# Patient Record
Sex: FEMALE | Race: BLACK OR AFRICAN AMERICAN | NOT HISPANIC OR LATINO
[De-identification: names, ages, dates, MRNs, and addresses within clinical notes are randomized per-mention and may not be internally consistent; named-entity substitution may affect disease eponyms.]

---

## 2018-10-18 PROBLEM — Z00.00 ENCOUNTER FOR PREVENTIVE HEALTH EXAMINATION: Status: ACTIVE | Noted: 2018-10-18

## 2018-11-09 ENCOUNTER — APPOINTMENT (OUTPATIENT)
Dept: PULMONOLOGY | Facility: CLINIC | Age: 78
End: 2018-11-09
Payer: MEDICARE

## 2018-11-09 VITALS
DIASTOLIC BLOOD PRESSURE: 80 MMHG | WEIGHT: 160 LBS | HEIGHT: 65 IN | OXYGEN SATURATION: 98 % | SYSTOLIC BLOOD PRESSURE: 140 MMHG | HEART RATE: 88 BPM | BODY MASS INDEX: 26.66 KG/M2 | TEMPERATURE: 97 F

## 2018-11-09 DIAGNOSIS — Z87.19 PERSONAL HISTORY OF OTHER DISEASES OF THE DIGESTIVE SYSTEM: ICD-10-CM

## 2018-11-09 DIAGNOSIS — R05 COUGH: ICD-10-CM

## 2018-11-09 DIAGNOSIS — Z78.9 OTHER SPECIFIED HEALTH STATUS: ICD-10-CM

## 2018-11-09 PROCEDURE — 94010 BREATHING CAPACITY TEST: CPT

## 2018-11-09 PROCEDURE — 99203 OFFICE O/P NEW LOW 30 MIN: CPT | Mod: 25

## 2018-11-09 PROCEDURE — 71046 X-RAY EXAM CHEST 2 VIEWS: CPT

## 2018-11-10 PROBLEM — Z87.19 HISTORY OF GASTROESOPHAGEAL REFLUX (GERD): Status: RESOLVED | Noted: 2018-11-10 | Resolved: 2018-11-10

## 2018-11-10 PROBLEM — R05 CHRONIC COUGH: Status: ACTIVE | Noted: 2018-11-09

## 2019-01-22 ENCOUNTER — APPOINTMENT (OUTPATIENT)
Dept: PULMONOLOGY | Facility: CLINIC | Age: 79
End: 2019-01-22
Payer: MEDICARE

## 2019-01-22 VITALS
HEIGHT: 65 IN | TEMPERATURE: 98.6 F | HEART RATE: 87 BPM | SYSTOLIC BLOOD PRESSURE: 118 MMHG | OXYGEN SATURATION: 100 % | DIASTOLIC BLOOD PRESSURE: 80 MMHG

## 2019-01-22 PROCEDURE — 94010 BREATHING CAPACITY TEST: CPT

## 2019-01-22 PROCEDURE — 99213 OFFICE O/P EST LOW 20 MIN: CPT | Mod: 25

## 2019-01-23 NOTE — DISCUSSION/SUMMARY
[FreeTextEntry1] : suggested she take rianitidine prior to a meal of heavy hot sauce\par \par she'll come off of the omeprazole for now.\par \par no follow up needed

## 2019-01-23 NOTE — HISTORY OF PRESENT ILLNESS
[FreeTextEntry1] : patient with chronic cough who was placed on omeprazole bid and cough is much better,  also cut down on hot sauce.

## 2019-01-23 NOTE — REVIEW OF SYSTEMS
[Cough] : no cough [Sputum] : not coughing up ~M sputum [Dyspnea] : no dyspnea [Negative] : Neurologic

## 2019-01-23 NOTE — PHYSICAL EXAM
[General Appearance - Well Developed] : well developed [Normal Appearance] : normal appearance [Well Groomed] : well groomed [General Appearance - Well Nourished] : well nourished [No Deformities] : no deformities [General Appearance - In No Acute Distress] : no acute distress [Normal Conjunctiva] : the conjunctiva exhibited no abnormalities [Eyelids - No Xanthelasma] : the eyelids demonstrated no xanthelasmas [Normal Oropharynx] : normal oropharynx [Neck Appearance] : the appearance of the neck was normal [Neck Cervical Mass (___cm)] : no neck mass was observed [Jugular Venous Distention Increased] : there was no jugular-venous distention [Thyroid Diffuse Enlargement] : the thyroid was not enlarged [Thyroid Nodule] : there were no palpable thyroid nodules [Heart Rate And Rhythm] : heart rate and rhythm were normal [Heart Sounds] : normal S1 and S2 [Murmurs] : no murmurs present [Respiration, Rhythm And Depth] : normal respiratory rhythm and effort [Exaggerated Use Of Accessory Muscles For Inspiration] : no accessory muscle use [Auscultation Breath Sounds / Voice Sounds] : lungs were clear to auscultation bilaterally [Abdomen Soft] : soft [Abdomen Tenderness] : non-tender [Abdomen Mass (___ Cm)] : no abdominal mass palpated [Nail Clubbing] : no clubbing of the fingernails [Cyanosis, Localized] : no localized cyanosis [Petechial Hemorrhages (___cm)] : no petechial hemorrhages [Skin Color & Pigmentation] : normal skin color and pigmentation [] : no rash [No Venous Stasis] : no venous stasis [Skin Lesions] : no skin lesions [No Skin Ulcers] : no skin ulcer [No Xanthoma] : no  xanthoma was observed [Deep Tendon Reflexes (DTR)] : deep tendon reflexes were 2+ and symmetric [Sensation] : the sensory exam was normal to light touch and pinprick [No Focal Deficits] : no focal deficits [Oriented To Time, Place, And Person] : oriented to person, place, and time [Impaired Insight] : insight and judgment were intact [Affect] : the affect was normal

## 2019-07-12 ENCOUNTER — APPOINTMENT (OUTPATIENT)
Dept: ORTHOPEDIC SURGERY | Facility: CLINIC | Age: 79
End: 2019-07-12
Payer: MEDICARE

## 2019-07-12 VITALS — WEIGHT: 160 LBS | BODY MASS INDEX: 26.66 KG/M2 | HEIGHT: 65 IN

## 2019-07-12 DIAGNOSIS — M25.551 PAIN IN RIGHT HIP: ICD-10-CM

## 2019-07-12 PROCEDURE — 99213 OFFICE O/P EST LOW 20 MIN: CPT | Mod: 25

## 2019-07-12 PROCEDURE — 20611 DRAIN/INJ JOINT/BURSA W/US: CPT | Mod: 50

## 2019-07-12 NOTE — REASON FOR VISIT
[Follow-Up Visit] : a follow-up visit for [Knee Pain] : knee pain [FreeTextEntry2] : BILATERAL KNEE PAIN, HIP PAIN

## 2019-07-12 NOTE — HISTORY OF PRESENT ILLNESS
[Pain Location] : pain [Constant] : ~He/She~ states the symptoms seem to be constant [] : right & left knee [Walking] : worsened by walking [de-identified] : BILATERAL KNEE PAIN\par RIGHT KNEE WORSE THEN LEFT\par JULY 2018  MUCH WORSE \par PAIN LEVEL 10/10\par CONSTANT PAIN\par RADIATING PAIN\par PAIN IN RIGHT HIP- CRACKING, LIMITED WEIGHT BEARING\par NOTHING HELPS\par WORSE WITH WALKING, UP AND DOWN STAIRS\par BENDING\par CRACKING\par SWELLING\par STIFFNESS

## 2019-07-12 NOTE — PROCEDURE
[de-identified] : Patient has demonstrated limited relief from NSAIDS, rest, exercises / PT , and after discussion of the risks and benefits, the patient has elected to proceed with a\par n ULTRASOUND GUIDED corticosteroid injection into the BILATERAL SupeLat PF Knee\par  \par Confirmed that the patient does not have history of prior adverse reactions, active, infections, or relevant allergies. There was no effusion, erythema, or warmth, and the skin was clear\par \par The skin was sterilized with alcohol. Ethyl Chloride was used as a topical anesthetic. Routine sterile technique. \par  \par The KNEE JOINT  was injected utilizing ULTRASOUND GUIDANCEwith KENALOG + LIDOCAINE. The injection was completed without complication and a bandage was applied.\par \par The patient tolerated the procedure well and was given post-injection instructions.Rec: Cold therapy, analgesics, avoid heavy activity.\par \par MEDICATION: Lidocaine 1% 4cc + 10mg of Kenalog EACH KNEE\par \par EFFUSION ASPIRATED  - LRFT 25CC, RIGHT 30CC\par

## 2019-07-12 NOTE — PHYSICAL EXAM
[de-identified] : PHYSICAL EXAM BILATERAL KNEES\par \par AROM\par RIGHT  0- 100\par LEFT    0- 100\par \par SPECIAL TESTS\par \par PATELLAR GRIND = GRIND \par DRAWER  = NEG\par LACHMAN = NEG\par MACMURRAY = NEG \par \par MOTOR = GROSSLY INTACT\par SENSORY = GROSSLY INTACT \par \par \par

## 2019-11-11 ENCOUNTER — MEDICATION RENEWAL (OUTPATIENT)
Age: 79
End: 2019-11-11

## 2020-01-16 ENCOUNTER — APPOINTMENT (OUTPATIENT)
Dept: ORTHOPEDIC SURGERY | Facility: CLINIC | Age: 80
End: 2020-01-16
Payer: MEDICARE

## 2020-01-16 PROCEDURE — 20611 DRAIN/INJ JOINT/BURSA W/US: CPT | Mod: 50

## 2020-01-16 PROCEDURE — 99214 OFFICE O/P EST MOD 30 MIN: CPT | Mod: 25

## 2020-01-16 NOTE — PROCEDURE
[de-identified] : INJECTION / ASPIRATION BILATERAL KNEES\par \par Patient has demonstrated limited relief from NSAIDS, rest, exercises / PT , and after discussion of the risks and benefits, the patient has elected to proceed with a\par n ULTRASOUND GUIDED corticosteroid injection into the BILATERAL SupeLat PF Knee\par  \par Confirmed that the patient does not have history of prior adverse reactions, active, infections, or relevant allergies. There was no effusion, erythema, or warmth, and the skin was clear\par \par The skin was sterilized with alcohol. Ethyl Chloride was used as a topical anesthetic. Routine sterile technique. \par  \par The KNEE JOINT  was injected utilizing ULTRASOUND GUIDANCEwith KENALOG + LIDOCAINE. The injection was completed without complication and a bandage was applied.\par \par The patient tolerated the procedure well and was given post-injection instructions.Rec: Cold therapy, analgesics, avoid heavy activity.\par \par MEDICATION: Lidocaine 1% 4cc + 10mg of Kenalog\par \par EFFUSION: RIGHT 27CC, LEFT 10CC

## 2020-01-16 NOTE — PHYSICAL EXAM
[de-identified] : PHYSICAL EXAM BILATERAL KNEES\par \par MODERATE EFFUSION  RIGHT KNEE \par AROM\par RIGHT  0- 100\par LEFT    0- 100\par \par SPECIAL TESTS\par \par PATELLAR GRIND = GRIND \par DRAWER  = NEG\par LACHMAN = NEG\par MACMURRAY = NEG \par \par MOTOR = GROSSLY INTACT\par SENSORY = GROSSLY INTACT \par \par \par

## 2020-01-16 NOTE — HISTORY OF PRESENT ILLNESS
[de-identified] : RIGHT KNEE\par FOLLOW UP\par FLARE UP 2 WEEKS AGO\par PAIN LEVEL 8/10\par WORSE WITH WEIGHTBEARING\par SWELLING\par STIFFNESS\par INSTABILITY

## 2020-09-15 ENCOUNTER — LABORATORY RESULT (OUTPATIENT)
Age: 80
End: 2020-09-15

## 2020-09-15 ENCOUNTER — APPOINTMENT (OUTPATIENT)
Dept: NEPHROLOGY | Facility: CLINIC | Age: 80
End: 2020-09-15
Payer: MEDICARE

## 2020-09-15 VITALS — DIASTOLIC BLOOD PRESSURE: 72 MMHG | HEART RATE: 70 BPM | SYSTOLIC BLOOD PRESSURE: 115 MMHG

## 2020-09-15 VITALS — SYSTOLIC BLOOD PRESSURE: 124 MMHG | HEART RATE: 72 BPM | DIASTOLIC BLOOD PRESSURE: 101 MMHG

## 2020-09-15 VITALS — TEMPERATURE: 97.8 F

## 2020-09-15 PROCEDURE — 36415 COLL VENOUS BLD VENIPUNCTURE: CPT

## 2020-09-15 PROCEDURE — 99204 OFFICE O/P NEW MOD 45 MIN: CPT | Mod: 25

## 2020-09-15 RX ORDER — HYALURONATE SOD, CROSS-LINKED 30 MG/3 ML
30 SYRINGE (ML) INTRAARTICULAR
Qty: 2 | Refills: 0 | Status: DISCONTINUED | OUTPATIENT
Start: 2020-01-16 | End: 2020-09-15

## 2020-09-15 RX ORDER — HYALURONATE SOD, CROSS-LINKED 30 MG/3 ML
30 SYRINGE (ML) INTRAARTICULAR
Qty: 2 | Refills: 0 | Status: DISCONTINUED | OUTPATIENT
Start: 2019-11-11 | End: 2020-09-15

## 2020-09-15 RX ORDER — HYALURONATE SOD, CROSS-LINKED 30 MG/3 ML
30 SYRINGE (ML) INTRAARTICULAR
Qty: 2 | Refills: 0 | Status: DISCONTINUED | COMMUNITY
Start: 2019-07-12 | End: 2020-09-15

## 2020-09-15 RX ORDER — NAPROXEN 500 MG/1
500 TABLET ORAL
Refills: 0 | Status: DISCONTINUED | COMMUNITY
End: 2020-09-15

## 2020-09-15 RX ORDER — OMEPRAZOLE 20 MG/1
20 CAPSULE, DELAYED RELEASE ORAL TWICE DAILY
Qty: 60 | Refills: 11 | Status: DISCONTINUED | COMMUNITY
Start: 2018-11-09 | End: 2020-09-15

## 2020-09-15 NOTE — PHYSICAL EXAM
[General Appearance - Alert] : alert [General Appearance - In No Acute Distress] : in no acute distress [Sclera] : the sclera and conjunctiva were normal [Extraocular Movements] : extraocular movements were intact [PERRL With Normal Accommodation] : pupils were equal in size, round, and reactive to light [Outer Ear] : the ears and nose were normal in appearance [Neck Cervical Mass (___cm)] : no neck mass was observed [Neck Appearance] : the appearance of the neck was normal [Thyroid Diffuse Enlargement] : the thyroid was not enlarged [Thyroid Nodule] : there were no palpable thyroid nodules [Jugular Venous Distention Increased] : there was no jugular-venous distention [Heart Rate And Rhythm] : heart rate was normal and rhythm regular [Auscultation Breath Sounds / Voice Sounds] : lungs were clear to auscultation bilaterally [Heart Sounds] : normal S1 and S2 [Murmurs] : no murmurs [Heart Sounds Gallop] : no gallops [Heart Sounds Pericardial Friction Rub] : no pericardial rub [Full Pulse] : the pedal pulses are present [Edema] : there was no peripheral edema [Bowel Sounds] : normal bowel sounds [Abdomen Soft] : soft [Abdomen Tenderness] : non-tender [Abdomen Mass (___ Cm)] : no abdominal mass palpated [Cervical Lymph Nodes Enlarged Posterior Bilaterally] : posterior cervical [Cervical Lymph Nodes Enlarged Anterior Bilaterally] : anterior cervical [Abnormal Walk] : normal gait [Supraclavicular Lymph Nodes Enlarged Bilaterally] : supraclavicular [Nail Clubbing] : no clubbing  or cyanosis of the fingernails [Musculoskeletal - Swelling] : no joint swelling seen [Motor Tone] : muscle strength and tone were normal [Skin Color & Pigmentation] : normal skin color and pigmentation [Skin Turgor] : normal skin turgor [] : no rash [Oriented To Time, Place, And Person] : oriented to person, place, and time [Affect] : the affect was normal [Impaired Insight] : insight and judgment were intact

## 2020-09-15 NOTE — CONSULT LETTER
[FreeTextEntry1] : Dear Aidan,\par \par I had the pleasure of seeing Maryanne Barrett in consultation for kidney disease. My note is attached.\par \par Thank you for the opportunity to participate in the care of your patient.\par \par Please call me on my cell phone at [phone] or in the office at [phone] if you have any questions.\par \par Best regards,\par \par Rusty\par \par Rusty Nevarez MD, FACP, FASN\par [address], [place]\par [email or web address]\par Office: [phone]\par Mobile: [phone]

## 2020-09-15 NOTE — REVIEW OF SYSTEMS
[Difficulty Walking] : difficulty walking [Negative] : Heme/Lymph [de-identified] : walks with walker

## 2020-09-15 NOTE — HISTORY OF PRESENT ILLNESS
[FreeTextEntry1] : Kindly referred by Aidan Howard for elevated creatinine. \par \par * The previous records were reviewed and summarized. Creatinine 1.26 (eGFR 46) on 7/23/20. Her creatinine has slowly increased from 0.9 in 6/19 to 1.05 in 2/4. No proteinuria or hematuria. Naproxen had been listed as one of her medications, but she says she has not been taking this. \par \par * The patient denies exposure to chronic NSAIDs, chronic PPIs, green smoothies, creatine, or herbal supplements. The patient denies a history of kidney stones or UTIs. No HTN or DM. No significant nocturia. No recent renal ultrasound. They are unaware of proteinuria or hematuria. \par

## 2020-09-15 NOTE — ASSESSMENT
[FreeTextEntry1] : # Slight worsening in renal function over 1 year of unclear cause, now CKD stage 3.\par * Recheck labs, including monoclonal protein evaluation.\par *  * A point of care renal ultrasound using the Butterfly iQ was performed and the images were personally reviewed. (The patient understands that this was a brief study to evaluate for hydronephrosis and not a complete renal ultrasound.) The ultrasound showed no significant hydronephrosis. A complete renal ultrasound was recommended and information was provided to the patient about scheduling. They were instructed that this imaging study is important for their health and that it is their responsibility to make and keep this appointment. All their questions were answered.\par * The patient has been counseled that chronic kidney disease is a significant condition and regular office followup with me (at least every 4 months for now) is essential for monitoring, and that it is their responsibility to make a follow up appointment.\par * A counseling information sheet has been given (currently or previously, in-person or electronically). All their questions were answered.\par * The patient has been counseled never to stop taking their medications without discussing it with me or another doctor.\par * The patient has been counseled on risk of acute renal failure and instructed to immediately call and speak with me or go immediately to ER with any severe symptoms, nausea, vomiting, diarrhea, chest pain, or shortness of breath.\par * The patient has been counseled on avoiding NSAIDs.\par \par # HTN controlled.\par * The patient's blood pressure was checked with the [employer]ron [id number] using the SPRINT trial protocol after sitting quietly in an empty room with arm supported, back supported, and feet on the floor for 5 minutes. The average of 3 readings were taken.\par * A counseling information sheet has been given (currently or previously, in-person or electronically). All their questions were answered.\par * The patient has been counseled to check their BP at home with an automatic arm cuff, write down the readings, and reach me directly on the phone immediately if they are persistently > 180 systolic or if SBP is less than 100 or if lightheadedness develops. They were counseled to bring in all blood pressure readings and medications next visit.\par * The patient has been counseled that they have a a significant medical condition and regular office followup (at least every 4 months for now) is essential for monitoring, and that it is their responsibility to make follow up appointments.\par * The patient also has been counseled that they must never stop or change any medications without discussing this with me (or another physician). \par \par

## 2020-09-27 LAB
25(OH)D3 SERPL-MCNC: 21.2 NG/ML
ALBUMIN MFR SERPL ELPH: 58.5 %
ALBUMIN SERPL ELPH-MCNC: 4.3 G/DL
ALBUMIN SERPL-MCNC: 4 G/DL
ALBUMIN/GLOB SERPL: 1.4 RATIO
ALBUPE: 14.3 %
ALP BLD-CCNC: 94 U/L
ALPHA1 GLOB MFR SERPL ELPH: 4.2 %
ALPHA1 GLOB SERPL ELPH-MCNC: 0.3 G/DL
ALPHA1UPE: 33.8 %
ALPHA2 GLOB MFR SERPL ELPH: 9.8 %
ALPHA2 GLOB SERPL ELPH-MCNC: 0.7 G/DL
ALPHA2UPE: 24.7 %
ALT SERPL-CCNC: 15 U/L
ANION GAP SERPL CALC-SCNC: 14 MMOL/L
APPEARANCE: CLEAR
AST SERPL-CCNC: 19 U/L
B-GLOBULIN MFR SERPL ELPH: 11.5 %
B-GLOBULIN SERPL ELPH-MCNC: 0.8 G/DL
BASOPHILS # BLD AUTO: 0.04 K/UL
BASOPHILS NFR BLD AUTO: 0.8 %
BETAUPE: 18.6 %
BILIRUB SERPL-MCNC: 0.3 MG/DL
BILIRUBIN URINE: NEGATIVE
BLOOD URINE: NEGATIVE
BUN SERPL-MCNC: 15 MG/DL
CALCIUM SERPL-MCNC: 9.9 MG/DL
CALCIUM SERPL-MCNC: 9.9 MG/DL
CHLORIDE SERPL-SCNC: 104 MMOL/L
CO2 SERPL-SCNC: 25 MMOL/L
COLOR: YELLOW
CREAT SERPL-MCNC: 0.99 MG/DL
CREAT SPEC-SCNC: 260 MG/DL
CREAT SPEC-SCNC: 260 MG/DL
CREAT/PROT UR: 0.1 RATIO
CYSTATIN C SERPL-MCNC: 1.38 MG/L
DEPRECATED KAPPA LC FREE/LAMBDA SER: 1.16 RATIO
EOSINOPHIL # BLD AUTO: 0.08 K/UL
EOSINOPHIL NFR BLD AUTO: 1.6 %
GAMMA GLOB FLD ELPH-MCNC: 1.1 G/DL
GAMMA GLOB MFR SERPL ELPH: 16 %
GAMMAUPE: 8.6 %
GFR/BSA.PRED SERPLBLD CYS-BASED-ARV: 44 ML/MIN
GLUCOSE QUALITATIVE U: NEGATIVE
GLUCOSE SERPL-MCNC: 84 MG/DL
HCT VFR BLD CALC: 44.7 %
HGB BLD-MCNC: 14.1 G/DL
IGA 24H UR QL IFE: NORMAL
IGA SER QL IEP: 232 MG/DL
IGG SER QL IEP: 1079 MG/DL
IGM SER QL IEP: 78 MG/DL
IMM GRANULOCYTES NFR BLD AUTO: 0 %
INTERPRETATION SERPL IEP-IMP: NORMAL
KAPPA LC 24H UR QL: NORMAL
KAPPA LC CSF-MCNC: 1.13 MG/DL
KAPPA LC SERPL-MCNC: 1.31 MG/DL
KETONES URINE: NORMAL
LEUKOCYTE ESTERASE URINE: NEGATIVE
LYMPHOCYTES # BLD AUTO: 1.93 K/UL
LYMPHOCYTES NFR BLD AUTO: 39.6 %
M PROTEIN SPEC IFE-MCNC: NORMAL
MAGNESIUM SERPL-MCNC: 2.1 MG/DL
MAN DIFF?: NORMAL
MCHC RBC-ENTMCNC: 28.3 PG
MCHC RBC-ENTMCNC: 31.5 GM/DL
MCV RBC AUTO: 89.6 FL
MICROALBUMIN 24H UR DL<=1MG/L-MCNC: 1.5 MG/DL
MICROALBUMIN/CREAT 24H UR-RTO: 6 MG/G
MONOCYTES # BLD AUTO: 0.5 K/UL
MONOCYTES NFR BLD AUTO: 10.3 %
NEUTROPHILS # BLD AUTO: 2.32 K/UL
NEUTROPHILS NFR BLD AUTO: 47.7 %
NITRITE URINE: NEGATIVE
PARATHYROID HORMONE INTACT: 53 PG/ML
PH URINE: 5.5
PHOSPHATE SERPL-MCNC: 3.6 MG/DL
PLATELET # BLD AUTO: 206 K/UL
POTASSIUM SERPL-SCNC: 4 MMOL/L
PROT PATTERN 24H UR ELPH-IMP: NORMAL
PROT SERPL-MCNC: 6.9 G/DL
PROT UR-MCNC: 20 MG/DL
PROT UR-MCNC: 26 MG/DL
PROT UR-MCNC: 26 MG/DL
PROTEIN URINE: NORMAL
RBC # BLD: 4.99 M/UL
RBC # FLD: 14.4 %
SODIUM SERPL-SCNC: 143 MMOL/L
SPECIFIC GRAVITY URINE: 1.02
UROBILINOGEN URINE: NORMAL
VIT B12 SERPL-MCNC: 432 PG/ML
WBC # FLD AUTO: 4.87 K/UL

## 2021-02-05 ENCOUNTER — APPOINTMENT (OUTPATIENT)
Dept: NEPHROLOGY | Facility: CLINIC | Age: 81
End: 2021-02-05
Payer: MEDICARE

## 2021-02-05 VITALS — TEMPERATURE: 98.4 F | BODY MASS INDEX: 25.29 KG/M2 | WEIGHT: 152 LBS

## 2021-02-05 VITALS — SYSTOLIC BLOOD PRESSURE: 102 MMHG | DIASTOLIC BLOOD PRESSURE: 72 MMHG | HEART RATE: 96 BPM

## 2021-02-05 VITALS — HEART RATE: 80 BPM | DIASTOLIC BLOOD PRESSURE: 68 MMHG | SYSTOLIC BLOOD PRESSURE: 105 MMHG

## 2021-02-05 DIAGNOSIS — M25.561 PAIN IN RIGHT KNEE: ICD-10-CM

## 2021-02-05 DIAGNOSIS — M25.562 PAIN IN RIGHT KNEE: ICD-10-CM

## 2021-02-05 PROCEDURE — 99214 OFFICE O/P EST MOD 30 MIN: CPT

## 2021-02-05 PROCEDURE — 99072 ADDL SUPL MATRL&STAF TM PHE: CPT

## 2021-02-05 NOTE — ASSESSMENT
[FreeTextEntry1] : # CKD now improved.\par * Recheck labs.\par * The patient has been counseled that chronic kidney disease is a significant condition and regular office followup with me (at least every 4 months for now) is important for monitoring and their health, and that it is their responsibility to make a follow up appointment.\par * A counseling information sheet has been given (currently or previously, in-person or electronically). All their questions were answered.\par * The patient has been counseled never to stop taking their medications without discussing it with me or another doctor.\par * The patient has been counseled on risk of acute renal failure and instructed to immediately call and speak with me or go immediately to ER with any severe symptoms, nausea, vomiting, diarrhea, chest pain, or shortness of breath.\par * The patient has been counseled on avoiding NSAIDs.\par \par # Knee arthtiritis.\par * Avoid NSAIDs if possible.\par \par # HTN controlled.\par * Continue amlodipine.\par * The patient's blood pressure was checked with the Omron [id number] using the SPRINT trial protocol after sitting quietly in an empty room with arm supported, back supported, and feet on the floor for 5 minutes. The average of 3 readings were taken.\par * A counseling information sheet has been given (currently or previously, in-person or electronically). All their questions were answered.\par * The patient has been counseled to check their BP at home with an automatic arm cuff, write down the readings, and reach me directly on the phone immediately if they are persistently > 180 systolic or if SBP is less than 100 or if lightheadedness develops. They were counseled to bring in all blood pressure readings and medications next visit.\par * The patient has been counseled that regular office followup (at least every 4 months for now)  is important for monitoring and for their health, and that it is their responsibility to make follow up appointments.\par * The patient also has been counseled that they must never stop or change any medications without discussing this with me (or another physician). \par

## 2021-02-05 NOTE — PHYSICAL EXAM
[General Appearance - Alert] : alert [General Appearance - In No Acute Distress] : in no acute distress [Auscultation Breath Sounds / Voice Sounds] : lungs were clear to auscultation bilaterally [Heart Rate And Rhythm] : heart rate was normal and rhythm regular [Heart Sounds] : normal S1 and S2 [Heart Sounds Gallop] : no gallops [Murmurs] : no murmurs [Heart Sounds Pericardial Friction Rub] : no pericardial rub [Edema] : there was no peripheral edema [Bowel Sounds] : normal bowel sounds [Abdomen Soft] : soft [Abdomen Tenderness] : non-tender [] : no hepato-splenomegaly [Abdomen Mass (___ Cm)] : no abdominal mass palpated

## 2021-02-05 NOTE — HISTORY OF PRESENT ILLNESS
[FreeTextEntry1] : Kindly referred by Aidan Howard for elevated creatinine. \par \par * The previous records from [facility] were reviewed and summarized. * CKD improved, creatinine decreased to 0.99 last visit. Creatinine 0.92 in December. * HTN controlled on amlodipine. * Persistnet knee pain. She is avoiding NSAIDs. \par \par Previous history ([id number]): * The previous records were reviewed and summarized. Creatinine 1.26 (eGFR 46) on 7/23/20. Her creatinine has slowly increased from 0.9 in 6/19 to 1.05 in 2/4. No proteinuria or hematuria. Naproxen had been listed as one of her medications, but she says she has not been taking this. \par \par * The patient denies exposure to chronic NSAIDs, chronic PPIs, green smoothies, creatine, or herbal supplements. The patient denies a history of kidney stones or UTIs. No HTN or DM. No significant nocturia. No recent renal ultrasound. They are unaware of proteinuria or hematuria. \par

## 2021-04-05 ENCOUNTER — APPOINTMENT (OUTPATIENT)
Dept: ORTHOPEDIC SURGERY | Facility: CLINIC | Age: 81
End: 2021-04-05
Payer: MEDICARE

## 2021-04-05 PROCEDURE — 99072 ADDL SUPL MATRL&STAF TM PHE: CPT

## 2021-04-05 PROCEDURE — 99214 OFFICE O/P EST MOD 30 MIN: CPT | Mod: 25

## 2021-04-05 PROCEDURE — 20611 DRAIN/INJ JOINT/BURSA W/US: CPT | Mod: 50

## 2021-04-05 RX ORDER — ACETAMINOPHEN 325 MG/1
325 TABLET, FILM COATED ORAL
Qty: 60 | Refills: 0 | Status: ACTIVE | OUTPATIENT
Start: 2021-04-05

## 2021-04-05 NOTE — DISCUSSION/SUMMARY
[de-identified] : \par PATIENT HAS ELECTED TO PROCEED WITH KENALOG INJECTION KNEES \par RISKS AND BENEFITS DISCUSSED - VERBAL CONSENT OBTAINED \par \par \par POST INJECTION INSTRUCTIONS\par \par COLD THERAPY , TYLENOL  PRN\par \par HOME STRETCHING AND EXERCISES QD - HANDOUT PROVIDED \par \par START P.T.  2 WEEKS AFTER INJECTION \par \par \par

## 2021-04-05 NOTE — PROCEDURE
[de-identified] : INJECTION / ASPIRATION BILATERAL KNEES\par \par Patient has demonstrated limited relief from NSAIDS, rest, exercises / PT , and after discussion of the risks and benefits, the patient has elected to proceed with a\par n ULTRASOUND GUIDED corticosteroid injection into the BILATERAL SupeLat PF Knee\par  \par Confirmed that the patient does not have history of prior adverse reactions, active, infections, or relevant allergies. There was no effusion, erythema, or warmth, and the skin was clear\par \par The skin was sterilized with alcohol. Ethyl Chloride was used as a topical anesthetic. Routine sterile technique. \par  \par The KNEE JOINT was injected utilizing ULTRASOUND GUIDANCEwith KENALOG + LIDOCAINE. The injection was completed without complication and a bandage was applied.\par \par The patient tolerated the procedure well and was given post-injection instructions.Rec: Cold therapy, analgesics, avoid heavy activity.\par \par MEDICATION: Lidocaine 1% 4cc + 10mg of Kenalog\par \par EFFUSION: RIGHT 40CC, LEFT 25CC.

## 2021-04-05 NOTE — HISTORY OF PRESENT ILLNESS
[de-identified] : BILATERAL KNEE \par FOLLOW UP\par RIGHT WORSE THAN LEFT\par PAIN LEVEL: 10/10\par SWELLING \par LAST CORTISONE INJECTION 1/16/2020- NOT HELPFUL

## 2021-04-05 NOTE — PHYSICAL EXAM
[de-identified] : PHYSICAL EXAM BILATERAL KNEES\par \par MODERATE EFFUSION RIGHT KNEE \par AROM\par RIGHT 0- 100\par LEFT 0- 100\par \par SPECIAL TESTS\par \par PATELLAR GRIND = GRIND \par DRAWER = NEG\par LACHMAN = NEG\par MACMURRAY = NEG \par \par MOTOR = GROSSLY INTACT\par SENSORY = GROSSLY INTACT

## 2021-05-13 RX ORDER — TRAMADOL HYDROCHLORIDE 50 MG/1
0.5 TABLET ORAL
Qty: 0 | Refills: 0 | DISCHARGE
Start: 2021-05-13

## 2021-06-03 ENCOUNTER — APPOINTMENT (OUTPATIENT)
Dept: ORTHOPEDIC SURGERY | Facility: CLINIC | Age: 81
End: 2021-06-03
Payer: MEDICARE

## 2021-06-03 DIAGNOSIS — M25.461 EFFUSION, RIGHT KNEE: ICD-10-CM

## 2021-06-03 DIAGNOSIS — M25.462 EFFUSION, LEFT KNEE: ICD-10-CM

## 2021-06-03 PROCEDURE — 73562 X-RAY EXAM OF KNEE 3: CPT | Mod: 50

## 2021-06-03 PROCEDURE — 20611 DRAIN/INJ JOINT/BURSA W/US: CPT | Mod: 50

## 2021-06-03 PROCEDURE — 99213 OFFICE O/P EST LOW 20 MIN: CPT | Mod: 25

## 2021-06-03 NOTE — DISCUSSION/SUMMARY
[de-identified] : RECURRING PAINFUL EFFUSION LEFT KNEE\par \par SEVERE PF OA , MODERATE MEDIAL OA\par \par 65CC JOINT EFFUSION ASPIRATED \par \par KENALOG 40MG INJECTED\par \par \par REFER TO DR RODNEY FOR TKA - RIGHT OR BILATERAL

## 2021-06-03 NOTE — HISTORY OF PRESENT ILLNESS
[de-identified] : RIGHT KNEE \par FOLLOW UP \par FLARE UP MAY 21, 2021 -PT WENT TO ER- [facility] \par PAIN LEVEL:10/10 \par PREVIOUS CORTISONE INJECTION -NOT HELPFUL

## 2021-06-03 NOTE — PROCEDURE
[de-identified] : INJECTION ASPIRATION RIGHT AND LEFT KNEE\par \par Patient has demonstrated limited relief from NSAIDS, rest, exercises / PT , and after discussion of the risks and benefits, the patient has elected to proceed with a\par n ULTRASOUND GUIDED corticosteroid injection into the RIGHT AND  LEFT SupeLat PF Knee\par  \par Confirmed that the patient does not have history of prior adverse reactions, active, infections, or relevant allergies. There was no effusion, erythema, or warmth, and the skin was clear\par \par The skin was sterilized with alcohol. Ethyl Chloride was used as a topical anesthetic. Routine sterile technique. \par  \par The KNEE JOINT  was injected utilizing ULTRASOUND GUIDANCEwith KENALOG + LIDOCAINE. The injection was completed without complication and a bandage was applied.\par \par The patient tolerated the procedure well and was given post-injection instructions.Rec: Cold therapy, analgesics, avoid heavy activity.\par \par MEDICATION: Lidocaine 1% 4cc + 40mg of Kenalog\par \par EFFUSION ASPIRATED: RIGHT  56CC CLEAR YELLOW , LEFT 35CC CLEAR YELLOW \par

## 2021-06-08 ENCOUNTER — APPOINTMENT (OUTPATIENT)
Dept: NEPHROLOGY | Facility: CLINIC | Age: 81
End: 2021-06-08
Payer: MEDICARE

## 2021-06-08 VITALS — SYSTOLIC BLOOD PRESSURE: 152 MMHG | DIASTOLIC BLOOD PRESSURE: 80 MMHG | HEART RATE: 67 BPM

## 2021-06-08 VITALS — TEMPERATURE: 97.1 F

## 2021-06-08 PROCEDURE — 99214 OFFICE O/P EST MOD 30 MIN: CPT

## 2021-06-08 NOTE — PHYSICAL EXAM
[General Appearance - Alert] : alert [General Appearance - In No Acute Distress] : in no acute distress [Neck Appearance] : the appearance of the neck was normal [Neck Cervical Mass (___cm)] : no neck mass was observed [Jugular Venous Distention Increased] : there was no jugular-venous distention [Thyroid Diffuse Enlargement] : the thyroid was not enlarged [Thyroid Nodule] : there were no palpable thyroid nodules [Heart Rate And Rhythm] : heart rate was normal and rhythm regular [Heart Sounds] : normal S1 and S2 [Heart Sounds Gallop] : no gallops [Murmurs] : no murmurs [Heart Sounds Pericardial Friction Rub] : no pericardial rub [Edema] : there was no peripheral edema [Bowel Sounds] : normal bowel sounds [Abdomen Soft] : soft [Abdomen Tenderness] : non-tender [] : no hepato-splenomegaly [Abdomen Mass (___ Cm)] : no abdominal mass palpated [Cervical Lymph Nodes Enlarged Posterior Bilaterally] : posterior cervical [Cervical Lymph Nodes Enlarged Anterior Bilaterally] : anterior cervical [Supraclavicular Lymph Nodes Enlarged Bilaterally] : supraclavicular

## 2021-06-08 NOTE — HISTORY OF PRESENT ILLNESS
[FreeTextEntry1] : Kindly referred by Aidan Howard for elevated creatinine. \par \par * HTN uncontrolled on amlodipine 2.5. * LEONA improved, creatinine decresaed to 0.92. *  Persistent knee pain. She is avoiding NSAIDs.  \par \par Previous history (05Feb21): * The previous records from [facility] were reviewed and summarized. * CKD improved, creatinine decreased to 0.99 last visit. Creatinine 0.92 in December. * HTN controlled on amlodipine. * Persistnet knee pain. She is avoiding NSAIDs.  \par \par Previous history ([id number]): * The previous records were reviewed and summarized. Creatinine 1.26 (eGFR 46) on 7/23/20. Her creatinine has slowly increased from 0.9 in 6/19 to 1.05 in 2/4. No proteinuria or hematuria. Naproxen had been listed as one of her medications, but she says she has not been taking this. \par \par * The patient denies exposure to chronic NSAIDs, chronic PPIs, green smoothies, creatine, or herbal supplements. The patient denies a history of kidney stones or UTIs. No HTN or DM. No significant nocturia. No recent renal ultrasound. They are unaware of proteinuria or hematuria. \par

## 2021-06-08 NOTE — ASSESSMENT
[FreeTextEntry1] : # CKD now improved.\par * Therapies for kidney disease: blood pressure control; other evidence-based therapies including exercise, a plant-based lower oxalate diet, and 400 mcg folic acid daily\par * Cardiovascular disease prevention: counseling on healthy diet, physical activity, weight loss, alcohol limitation, blood pressure control\par * The patient has been counseled that chronic kidney disease is a significant condition and regular office followup with me (at least every 2 months for now) is important for monitoring and their health, and that it is their responsibility to make a follow up appointment.\par * The patient has been counseled never to stop taking their medications without discussing it with me or another doctor.\par * The patient has been counseled on avoiding NSAIDs.\par * The patient has been counseled on risk of acute renal failure and instructed to immediately call and speak with me or go immediately to ER with any severe symptoms, nausea, vomiting, diarrhea, chest pain, or shortness of breath.\par * A counseling information sheet has been given (today or previously). All their questions were answered.\par \par # HTN uncontrolled.\par * Increase amlodipine to 5.\par * The patient's blood pressure was checked with the [employer] [id number]-907XL using the SPRINT trial protocol after sitting quietly in an empty room with arm supported, back supported, and feet on the floor for 5 minutes. The average of 3 readings were taken.\par * A counseling information sheet has been given (currently or previously, in-person or electronically). All their questions were answered.\par * The patient has been counseled to check their BP at home with an automatic arm cuff, write down the readings, and reach me directly on the phone immediately if they are persistently > 180 systolic or if SBP is less than 100 or if lightheadedness develops. They were counseled to bring in all blood pressure readings and medications next visit.\par * The patient has been counseled that regular office followup (at least every 2 months for now)  is important for monitoring and for their health, and that it is their responsibility to make follow up appointments.\par * The patient also has been counseled that they must never stop or change any medications without discussing this with me (or another physician). \par \par # Knee arthritis.\par * Avoid NSAIDs if possible.

## 2021-06-25 ENCOUNTER — APPOINTMENT (OUTPATIENT)
Dept: ORTHOPEDIC SURGERY | Facility: CLINIC | Age: 81
End: 2021-06-25
Payer: MEDICARE

## 2021-06-25 PROCEDURE — 99214 OFFICE O/P EST MOD 30 MIN: CPT

## 2021-06-25 NOTE — REASON FOR VISIT
[Follow-Up Visit] : a follow-up visit for [FreeTextEntry2] : REFERRED BY DR. AYON FOR RIGHT KNEE REPLACEMENT

## 2021-06-25 NOTE — HISTORY OF PRESENT ILLNESS
[de-identified] : First visit with me for this patient is here to discuss right knee replacement. She's been treated with conservative measures by one of our other orthopedic surgeon in conservative measures are no longer helping. Patient should discuss knee replacement surgery

## 2021-06-25 NOTE — DISCUSSION/SUMMARY
[de-identified] : Patient I. discussed our options. The reasonable risks and benefits of knee replacement surgery were discussed in detail. Patient asked appropriate questions which were answered to her satisfaction. We talked about potential complications as well as to the convalescent expectations. The complications may require revision surgery such as component migration wear loosening infection stiffness instability.

## 2021-06-25 NOTE — PHYSICAL EXAM
[de-identified] : Right knee range of motion 0-95°. On examination seems the patella is laterally based. She is neurovascular intact distally. Quad tone is good. Knee is stable to stress rest no distress. [de-identified] : AP standing and lateral and sunrise views are obtained showing severe patella from arthritis of both knees more dense on the right. 2 femoral articulation is fairly well maintained with moderate lateral arthritis per

## 2021-06-30 ENCOUNTER — APPOINTMENT (OUTPATIENT)
Dept: NEPHROLOGY | Facility: CLINIC | Age: 81
End: 2021-06-30
Payer: MEDICARE

## 2021-06-30 VITALS — SYSTOLIC BLOOD PRESSURE: 105 MMHG | HEART RATE: 76 BPM | DIASTOLIC BLOOD PRESSURE: 66 MMHG

## 2021-06-30 DIAGNOSIS — R79.89 OTHER SPECIFIED ABNORMAL FINDINGS OF BLOOD CHEMISTRY: ICD-10-CM

## 2021-06-30 DIAGNOSIS — Z01.818 ENCOUNTER FOR OTHER PREPROCEDURAL EXAMINATION: ICD-10-CM

## 2021-06-30 DIAGNOSIS — I10 ESSENTIAL (PRIMARY) HYPERTENSION: ICD-10-CM

## 2021-06-30 PROCEDURE — 99214 OFFICE O/P EST MOD 30 MIN: CPT

## 2021-06-30 RX ORDER — AMLODIPINE BESYLATE 2.5 MG/1
2.5 TABLET ORAL DAILY
Qty: 90 | Refills: 3 | Status: ACTIVE | COMMUNITY
Start: 1900-01-01 | End: 1900-01-01

## 2021-06-30 NOTE — CONSULT LETTER
[FreeTextEntry1] : Dear Dr. Howard,\par \par I had the pleasure of seeing Maryanne Barrett in followup for HTN. My note is attached.\par \par Thank you for the opportunity to participate in the care of your patient.\par \par Please call me on my cell phone at [phone] or in the office at [phone] if you have any questions.\par \par Best regards,\par \par Rusty\par \par Rusty Nevarez MD, FACP, FASN\par [address], [place]\par [email or web address] Office: [phone]\par Mobile: [phone]

## 2021-06-30 NOTE — PHYSICAL EXAM
[General Appearance - Alert] : alert [General Appearance - In No Acute Distress] : in no acute distress [Neck Appearance] : the appearance of the neck was normal [Neck Cervical Mass (___cm)] : no neck mass was observed [Jugular Venous Distention Increased] : there was no jugular-venous distention [Thyroid Diffuse Enlargement] : the thyroid was not enlarged [Thyroid Nodule] : there were no palpable thyroid nodules [Auscultation Breath Sounds / Voice Sounds] : lungs were clear to auscultation bilaterally [Heart Rate And Rhythm] : heart rate was normal and rhythm regular [Heart Sounds] : normal S1 and S2 [Heart Sounds Gallop] : no gallops [Murmurs] : no murmurs [Heart Sounds Pericardial Friction Rub] : no pericardial rub [Edema] : there was no peripheral edema [Bowel Sounds] : normal bowel sounds [Abdomen Soft] : soft [] : no hepato-splenomegaly [Abdomen Tenderness] : non-tender [Abdomen Mass (___ Cm)] : no abdominal mass palpated [Cervical Lymph Nodes Enlarged Posterior Bilaterally] : posterior cervical [Cervical Lymph Nodes Enlarged Anterior Bilaterally] : anterior cervical [Supraclavicular Lymph Nodes Enlarged Bilaterally] : supraclavicular

## 2021-06-30 NOTE — HISTORY OF PRESENT ILLNESS
[FreeTextEntry1] : Kindly referred by Aidan Howard for elevated creatinine. \par \par * Scheduled for knee replacement. * No chest pain or SOB with exertion. No history of CHF, CVA, CAD. * Creatinine improved. eGFR now normalized. * HTN controlled. She has only been taking 2.5. \par \par Previous history (08Jun21): * HTN uncontrolled on amlodipine 2.5. * LEONA improved, creatinine decresaed to 0.92. *  Persistent knee pain. She is avoiding NSAIDs.  \par \par Previous history (05Feb21): * The previous records from [facility] were reviewed and summarized. * CKD improved, creatinine decreased to 0.99 last visit. Creatinine 0.92 in December. * HTN controlled on amlodipine. * Persistnet knee pain. She is avoiding NSAIDs.  \par \par Previous history (15Sep20): * The previous records were reviewed and summarized. Creatinine 1.26 (eGFR 46) on 7/23/20. Her creatinine has slowly increased from 0.9 in 6/19 to 1.05 in 2/4. No proteinuria or hematuria. Naproxen had been listed as one of her medications, but she says she has not been taking this. \par \par * The patient denies exposure to chronic NSAIDs, chronic PPIs, green smoothies, creatine, or herbal supplements. The patient denies a history of kidney stones or UTIs. No HTN or DM. No significant nocturia. No recent renal ultrasound. They are unaware of proteinuria or hematuria. \par

## 2021-06-30 NOTE — ASSESSMENT
[FreeTextEntry1] : # Preop for knee replacement.\par * No contraindications for surgery and may proceed from nephrology perspective. Full medical clearance from Dr. Howard pending. \par * RCRI low risk.\par * She will see Dr. Howard for medical clearance. \par \par # HTN controlled.\par * Cont amlodipine.\par * The patient's blood pressure was checked with the [employer] HEM-907XL using the SPRINT trial protocol after sitting quietly in an empty room with arm supported, back supported, and feet on the floor for 5 minutes. The average of 3 readings were taken.\par * A counseling information sheet has been given (currently or previously, in-person or electronically). All their questions were answered.\par * The patient has been counseled to check their BP at home with an automatic arm cuff, write down the readings, and reach me directly on the phone immediately if they are persistently > 180 systolic or if SBP is less than 100 or if lightheadedness develops. They were counseled to bring in all blood pressure readings and medications next visit.\par * The patient has been counseled that regular office followup (at least every 4 months for now)  is important for monitoring and for their health, and that it is their responsibility to make follow up appointments.\par * The patient also has been counseled that they must never stop or change any medications without discussing this with me (or another physician). \par \par # CKD improved.\par * Therapies for kidney disease: blood pressure control; other evidence-based therapies including exercise, a plant-based lower oxalate diet, and 400 mcg folic acid daily\par * Cardiovascular disease prevention: counseling on healthy diet, physical activity, weight loss, alcohol limitation, blood pressure control\par * The patient has been counseled that chronic kidney disease is a significant condition and regular office followup with me (at least every 4 months for now) is important for monitoring and their health, and that it is their responsibility to make a follow up appointment.\par * The patient has been counseled never to stop taking their medications without discussing it with me or another doctor.\par * The patient has been counseled on avoiding NSAIDs.\par * The patient has been counseled on risk of acute renal failure and instructed to immediately call and speak with me or go immediately to ER with any severe symptoms, nausea, vomiting, diarrhea, chest pain, or shortness of breath.\par * A counseling information sheet has been given (today or previously). All their questions were answered.

## 2021-07-20 ENCOUNTER — TRANSCRIPTION ENCOUNTER (OUTPATIENT)
Age: 81
End: 2021-07-20

## 2021-07-20 VITALS
OXYGEN SATURATION: 96 % | HEART RATE: 69 BPM | SYSTOLIC BLOOD PRESSURE: 129 MMHG | RESPIRATION RATE: 16 BRPM | DIASTOLIC BLOOD PRESSURE: 79 MMHG | HEIGHT: 64 IN | TEMPERATURE: 97 F | WEIGHT: 150.8 LBS

## 2021-07-20 RX ORDER — POVIDONE-IODINE 5 %
1 AEROSOL (ML) TOPICAL ONCE
Refills: 0 | Status: COMPLETED | OUTPATIENT
Start: 2021-07-21 | End: 2021-07-21

## 2021-07-20 NOTE — H&P ADULT - HISTORY OF PRESENT ILLNESS
81F with right knee pain x  Presents today for right TKA  81F with right knee pain x 2 years worsened over time without improvement. Failed conservative treatments. Denies numbness, tingling. Ambulates without assist. Pt denies any recent fevers/chills, chest pain, body aches, shortness of breath, sick contacts. Denies history of DVT/PE. Presents today for elective right total hip replacement.

## 2021-07-20 NOTE — H&P ADULT - NSHPPHYSICALEXAM_GEN_ALL_CORE
PE: Decreased ROM to right knee secondary to pain    Rest of PE per medical clearance NAD, sitting comfortably in chair   MSK: decreased ROM of right knee secondary to pain, SILT BLE, 5/5 EHL/FHL/TA/GS    Rest of PE per medical clearance

## 2021-07-20 NOTE — H&P ADULT - PROBLEM SELECTOR PLAN 4
Continue outpatient treatment regimen   Continue folic acid supplements and avoid NSAIDs for hx of CKD, improved

## 2021-07-20 NOTE — H&P ADULT - NSHPLABSRESULTS_GEN_ALL_CORE
Cr:  3M DOS Cr: 1.05 now, had hx of CKD in 7/2020 with Cr 1.26, for which he sees Dr. Nevarez for. He is on folic acid and avoiding NSAIDs.  Rest of Preop CBC, BMP, INR, UA within normal limits- reviewed by medical clearance.   PT/PTT DOS  3M DOS  Preop EKG: Sinus bradycardia, reviewed by medical clearance.

## 2021-07-20 NOTE — H&P ADULT - NSICDXPASTMEDICALHX_GEN_ALL_CORE_FT
PAST MEDICAL HISTORY:  Arthritis both knees    GERD (gastroesophageal reflux disease)     HTN (hypertension)

## 2021-07-20 NOTE — H&P ADULT - PROBLEM SELECTOR PLAN 1
Admit to Orthopaedic Service.  Presents today for elective right TKA   Pt medically stable and cleared for procedure today by  Admit to Orthopaedic Service.  Presents today for elective right TKA   Pt medically stable and cleared for procedure today by Dr. Howard and Dr. Nevarez

## 2021-07-21 ENCOUNTER — INPATIENT (INPATIENT)
Facility: HOSPITAL | Age: 81
LOS: 1 days | Discharge: HOME | DRG: 470 | End: 2021-07-23
Attending: SPECIALIST | Admitting: SPECIALIST
Payer: COMMERCIAL

## 2021-07-21 ENCOUNTER — APPOINTMENT (OUTPATIENT)
Dept: ORTHOPEDIC SURGERY | Facility: HOSPITAL | Age: 81
End: 2021-07-21
Payer: MEDICARE

## 2021-07-21 DIAGNOSIS — K21.9 GASTRO-ESOPHAGEAL REFLUX DISEASE WITHOUT ESOPHAGITIS: ICD-10-CM

## 2021-07-21 DIAGNOSIS — M19.90 UNSPECIFIED OSTEOARTHRITIS, UNSPECIFIED SITE: ICD-10-CM

## 2021-07-21 DIAGNOSIS — N18.9 CHRONIC KIDNEY DISEASE, UNSPECIFIED: ICD-10-CM

## 2021-07-21 DIAGNOSIS — I10 ESSENTIAL (PRIMARY) HYPERTENSION: ICD-10-CM

## 2021-07-21 LAB
APPEARANCE UR: ABNORMAL
APTT BLD: 29 SEC — SIGNIFICANT CHANGE UP (ref 27.5–35.5)
BACTERIA # UR AUTO: SIGNIFICANT CHANGE UP /HPF
BILIRUB UR-MCNC: NEGATIVE — SIGNIFICANT CHANGE UP
COLOR SPEC: YELLOW — SIGNIFICANT CHANGE UP
COMMENT - URINE: SIGNIFICANT CHANGE UP
COMMENT - URINE: SIGNIFICANT CHANGE UP
DIFF PNL FLD: NEGATIVE — SIGNIFICANT CHANGE UP
EPI CELLS # UR: ABNORMAL /HPF (ref 0–5)
GLUCOSE UR QL: NEGATIVE — SIGNIFICANT CHANGE UP
INR BLD: 1.07 — SIGNIFICANT CHANGE UP (ref 0.88–1.16)
KETONES UR-MCNC: NEGATIVE — SIGNIFICANT CHANGE UP
LEUKOCYTE ESTERASE UR-ACNC: ABNORMAL
NITRITE UR-MCNC: NEGATIVE — SIGNIFICANT CHANGE UP
PH UR: 5.5 — SIGNIFICANT CHANGE UP (ref 5–8)
PROT UR-MCNC: NEGATIVE MG/DL — SIGNIFICANT CHANGE UP
PROTHROM AB SERPL-ACNC: 12.8 SEC — SIGNIFICANT CHANGE UP (ref 10.6–13.6)
RBC CASTS # UR COMP ASSIST: < 5 /HPF — SIGNIFICANT CHANGE UP
SP GR SPEC: >=1.03 — SIGNIFICANT CHANGE UP (ref 1–1.03)
UROBILINOGEN FLD QL: 0.2 E.U./DL — SIGNIFICANT CHANGE UP
WBC UR QL: > 10 /HPF

## 2021-07-21 PROCEDURE — 73560 X-RAY EXAM OF KNEE 1 OR 2: CPT | Mod: 26,RT

## 2021-07-21 PROCEDURE — 27447 TOTAL KNEE ARTHROPLASTY: CPT | Mod: AS,RT

## 2021-07-21 PROCEDURE — 27447 TOTAL KNEE ARTHROPLASTY: CPT | Mod: RT

## 2021-07-21 RX ORDER — CEFAZOLIN SODIUM 1 G
2000 VIAL (EA) INJECTION EVERY 8 HOURS
Refills: 0 | Status: DISCONTINUED | OUTPATIENT
Start: 2021-07-21 | End: 2021-07-21

## 2021-07-21 RX ORDER — SODIUM CHLORIDE 9 MG/ML
1000 INJECTION, SOLUTION INTRAVENOUS
Refills: 0 | Status: DISCONTINUED | OUTPATIENT
Start: 2021-07-22 | End: 2021-07-23

## 2021-07-21 RX ORDER — ONDANSETRON 8 MG/1
8 TABLET, FILM COATED ORAL EVERY 8 HOURS
Refills: 0 | Status: DISCONTINUED | OUTPATIENT
Start: 2021-07-21 | End: 2021-07-23

## 2021-07-21 RX ORDER — HYDROMORPHONE HYDROCHLORIDE 2 MG/ML
0.5 INJECTION INTRAMUSCULAR; INTRAVENOUS; SUBCUTANEOUS ONCE
Refills: 0 | Status: DISCONTINUED | OUTPATIENT
Start: 2021-07-21 | End: 2021-07-23

## 2021-07-21 RX ORDER — TRAMADOL HYDROCHLORIDE 50 MG/1
50 TABLET ORAL EVERY 6 HOURS
Refills: 0 | Status: DISCONTINUED | OUTPATIENT
Start: 2021-07-21 | End: 2021-07-23

## 2021-07-21 RX ORDER — CHLORHEXIDINE GLUCONATE 213 G/1000ML
1 SOLUTION TOPICAL ONCE
Refills: 0 | Status: COMPLETED | OUTPATIENT
Start: 2021-07-21 | End: 2021-07-21

## 2021-07-21 RX ORDER — AMLODIPINE BESYLATE 2.5 MG/1
5 TABLET ORAL DAILY
Refills: 0 | Status: DISCONTINUED | OUTPATIENT
Start: 2021-07-21 | End: 2021-07-23

## 2021-07-21 RX ORDER — CEFAZOLIN SODIUM 1 G
2000 VIAL (EA) INJECTION EVERY 8 HOURS
Refills: 0 | Status: COMPLETED | OUTPATIENT
Start: 2021-07-21 | End: 2021-07-22

## 2021-07-21 RX ORDER — ATORVASTATIN CALCIUM 80 MG/1
20 TABLET, FILM COATED ORAL AT BEDTIME
Refills: 0 | Status: DISCONTINUED | OUTPATIENT
Start: 2021-07-21 | End: 2021-07-23

## 2021-07-21 RX ORDER — ACETAMINOPHEN 500 MG
975 TABLET ORAL EVERY 8 HOURS
Refills: 0 | Status: DISCONTINUED | OUTPATIENT
Start: 2021-07-21 | End: 2021-07-23

## 2021-07-21 RX ORDER — MAGNESIUM HYDROXIDE 400 MG/1
30 TABLET, CHEWABLE ORAL DAILY
Refills: 0 | Status: DISCONTINUED | OUTPATIENT
Start: 2021-07-21 | End: 2021-07-23

## 2021-07-21 RX ORDER — OXYCODONE HYDROCHLORIDE 5 MG/1
5 TABLET ORAL
Refills: 0 | Status: DISCONTINUED | OUTPATIENT
Start: 2021-07-21 | End: 2021-07-23

## 2021-07-21 RX ORDER — ASPIRIN/CALCIUM CARB/MAGNESIUM 324 MG
325 TABLET ORAL DAILY
Refills: 0 | Status: DISCONTINUED | OUTPATIENT
Start: 2021-07-21 | End: 2021-07-23

## 2021-07-21 RX ORDER — OXYCODONE HYDROCHLORIDE 5 MG/1
10 TABLET ORAL ONCE
Refills: 0 | Status: DISCONTINUED | OUTPATIENT
Start: 2021-07-21 | End: 2021-07-21

## 2021-07-21 RX ORDER — OXYCODONE HYDROCHLORIDE 5 MG/1
10 TABLET ORAL
Refills: 0 | Status: DISCONTINUED | OUTPATIENT
Start: 2021-07-21 | End: 2021-07-23

## 2021-07-21 RX ORDER — SENNA PLUS 8.6 MG/1
2 TABLET ORAL AT BEDTIME
Refills: 0 | Status: DISCONTINUED | OUTPATIENT
Start: 2021-07-21 | End: 2021-07-23

## 2021-07-21 RX ORDER — OXYCODONE HYDROCHLORIDE 5 MG/1
20 TABLET ORAL ONCE
Refills: 0 | Status: DISCONTINUED | OUTPATIENT
Start: 2021-07-21 | End: 2021-07-21

## 2021-07-21 RX ORDER — CEFAZOLIN SODIUM 1 G
1000 VIAL (EA) INJECTION EVERY 8 HOURS
Refills: 0 | Status: DISCONTINUED | OUTPATIENT
Start: 2021-07-21 | End: 2021-07-21

## 2021-07-21 RX ORDER — CELECOXIB 200 MG/1
400 CAPSULE ORAL ONCE
Refills: 0 | Status: COMPLETED | OUTPATIENT
Start: 2021-07-21 | End: 2021-07-21

## 2021-07-21 RX ORDER — HYDROMORPHONE HYDROCHLORIDE 2 MG/ML
0.5 INJECTION INTRAMUSCULAR; INTRAVENOUS; SUBCUTANEOUS
Refills: 0 | Status: DISCONTINUED | OUTPATIENT
Start: 2021-07-21 | End: 2021-07-23

## 2021-07-21 RX ADMIN — HYDROMORPHONE HYDROCHLORIDE 0.5 MILLIGRAM(S): 2 INJECTION INTRAMUSCULAR; INTRAVENOUS; SUBCUTANEOUS at 15:45

## 2021-07-21 RX ADMIN — HYDROMORPHONE HYDROCHLORIDE 0.5 MILLIGRAM(S): 2 INJECTION INTRAMUSCULAR; INTRAVENOUS; SUBCUTANEOUS at 15:34

## 2021-07-21 RX ADMIN — OXYCODONE HYDROCHLORIDE 10 MILLIGRAM(S): 5 TABLET ORAL at 10:46

## 2021-07-21 RX ADMIN — OXYCODONE HYDROCHLORIDE 5 MILLIGRAM(S): 5 TABLET ORAL at 21:06

## 2021-07-21 RX ADMIN — CELECOXIB 400 MILLIGRAM(S): 200 CAPSULE ORAL at 10:46

## 2021-07-21 RX ADMIN — TRAMADOL HYDROCHLORIDE 50 MILLIGRAM(S): 50 TABLET ORAL at 19:15

## 2021-07-21 RX ADMIN — TRAMADOL HYDROCHLORIDE 50 MILLIGRAM(S): 50 TABLET ORAL at 20:15

## 2021-07-21 RX ADMIN — SENNA PLUS 2 TABLET(S): 8.6 TABLET ORAL at 21:04

## 2021-07-21 RX ADMIN — CHLORHEXIDINE GLUCONATE 1 APPLICATION(S): 213 SOLUTION TOPICAL at 07:30

## 2021-07-21 RX ADMIN — Medication 975 MILLIGRAM(S): at 21:04

## 2021-07-21 RX ADMIN — ATORVASTATIN CALCIUM 20 MILLIGRAM(S): 80 TABLET, FILM COATED ORAL at 21:04

## 2021-07-21 RX ADMIN — Medication 1 APPLICATION(S): at 08:00

## 2021-07-21 RX ADMIN — Medication 975 MILLIGRAM(S): at 22:04

## 2021-07-21 RX ADMIN — OXYCODONE HYDROCHLORIDE 5 MILLIGRAM(S): 5 TABLET ORAL at 22:06

## 2021-07-21 RX ADMIN — Medication 1000 MILLIGRAM(S): at 19:12

## 2021-07-21 NOTE — PHYSICAL THERAPY INITIAL EVALUATION ADULT - IMPAIRED TRANSFERS: SIT/STAND, REHAB EVAL
impaired balance/decreased flexibility/impaired motor control/pain/impaired postural control/decreased ROM/decreased strength

## 2021-07-21 NOTE — PHYSICAL THERAPY INITIAL EVALUATION ADULT - ADDITIONAL COMMENTS
Pt lives in an elevator access apartment alone and daughter will be staying with patient post d/c. Pt ambulates with SC at baseline.

## 2021-07-21 NOTE — PHYSICAL THERAPY INITIAL EVALUATION ADULT - PERTINENT HX OF CURRENT PROBLEM, REHAB EVAL
81F with right knee pain x 2 years worsened over time without improvement. Failed conservative treatments. Denies numbness, tingling. Ambulates without assist. Pt denies any recent fevers/chills, chest pain, body aches, shortness of breath, sick contacts. Denies history of DVT/PE. Presents today for elective right total hip replacement.

## 2021-07-22 ENCOUNTER — TRANSCRIPTION ENCOUNTER (OUTPATIENT)
Age: 81
End: 2021-07-22

## 2021-07-22 LAB
ANION GAP SERPL CALC-SCNC: 8 MMOL/L — SIGNIFICANT CHANGE UP (ref 5–17)
BUN SERPL-MCNC: 16 MG/DL — SIGNIFICANT CHANGE UP (ref 7–23)
CALCIUM SERPL-MCNC: 9.7 MG/DL — SIGNIFICANT CHANGE UP (ref 8.4–10.5)
CHLORIDE SERPL-SCNC: 106 MMOL/L — SIGNIFICANT CHANGE UP (ref 96–108)
CO2 SERPL-SCNC: 28 MMOL/L — SIGNIFICANT CHANGE UP (ref 22–31)
COVID-19 SPIKE DOMAIN AB INTERP: POSITIVE
COVID-19 SPIKE DOMAIN ANTIBODY RESULT: >250 U/ML — HIGH
CREAT SERPL-MCNC: 0.91 MG/DL — SIGNIFICANT CHANGE UP (ref 0.5–1.3)
GLUCOSE SERPL-MCNC: 115 MG/DL — HIGH (ref 70–99)
HCT VFR BLD CALC: 40.5 % — SIGNIFICANT CHANGE UP (ref 34.5–45)
HGB BLD-MCNC: 13 G/DL — SIGNIFICANT CHANGE UP (ref 11.5–15.5)
MCHC RBC-ENTMCNC: 29 PG — SIGNIFICANT CHANGE UP (ref 27–34)
MCHC RBC-ENTMCNC: 32.1 GM/DL — SIGNIFICANT CHANGE UP (ref 32–36)
MCV RBC AUTO: 90.4 FL — SIGNIFICANT CHANGE UP (ref 80–100)
NRBC # BLD: 0 /100 WBCS — SIGNIFICANT CHANGE UP (ref 0–0)
PLATELET # BLD AUTO: 229 K/UL — SIGNIFICANT CHANGE UP (ref 150–400)
POTASSIUM SERPL-MCNC: 4.5 MMOL/L — SIGNIFICANT CHANGE UP (ref 3.5–5.3)
POTASSIUM SERPL-SCNC: 4.5 MMOL/L — SIGNIFICANT CHANGE UP (ref 3.5–5.3)
RBC # BLD: 4.48 M/UL — SIGNIFICANT CHANGE UP (ref 3.8–5.2)
RBC # FLD: 13.4 % — SIGNIFICANT CHANGE UP (ref 10.3–14.5)
SARS-COV-2 IGG+IGM SERPL QL IA: >250 U/ML — HIGH
SARS-COV-2 IGG+IGM SERPL QL IA: POSITIVE
SODIUM SERPL-SCNC: 142 MMOL/L — SIGNIFICANT CHANGE UP (ref 135–145)
WBC # BLD: 7.96 K/UL — SIGNIFICANT CHANGE UP (ref 3.8–10.5)
WBC # FLD AUTO: 7.96 K/UL — SIGNIFICANT CHANGE UP (ref 3.8–10.5)

## 2021-07-22 RX ORDER — POLYETHYLENE GLYCOL 3350 17 G/17G
17 POWDER, FOR SOLUTION ORAL DAILY
Refills: 0 | Status: DISCONTINUED | OUTPATIENT
Start: 2021-07-22 | End: 2021-07-23

## 2021-07-22 RX ADMIN — Medication 975 MILLIGRAM(S): at 05:39

## 2021-07-22 RX ADMIN — Medication 975 MILLIGRAM(S): at 15:49

## 2021-07-22 RX ADMIN — OXYCODONE HYDROCHLORIDE 10 MILLIGRAM(S): 5 TABLET ORAL at 14:49

## 2021-07-22 RX ADMIN — Medication 975 MILLIGRAM(S): at 14:49

## 2021-07-22 RX ADMIN — OXYCODONE HYDROCHLORIDE 5 MILLIGRAM(S): 5 TABLET ORAL at 12:05

## 2021-07-22 RX ADMIN — OXYCODONE HYDROCHLORIDE 10 MILLIGRAM(S): 5 TABLET ORAL at 15:49

## 2021-07-22 RX ADMIN — Medication 975 MILLIGRAM(S): at 23:13

## 2021-07-22 RX ADMIN — SENNA PLUS 2 TABLET(S): 8.6 TABLET ORAL at 22:13

## 2021-07-22 RX ADMIN — Medication 975 MILLIGRAM(S): at 22:13

## 2021-07-22 RX ADMIN — Medication 2000 MILLIGRAM(S): at 11:05

## 2021-07-22 RX ADMIN — OXYCODONE HYDROCHLORIDE 10 MILLIGRAM(S): 5 TABLET ORAL at 18:45

## 2021-07-22 RX ADMIN — OXYCODONE HYDROCHLORIDE 5 MILLIGRAM(S): 5 TABLET ORAL at 11:05

## 2021-07-22 RX ADMIN — OXYCODONE HYDROCHLORIDE 10 MILLIGRAM(S): 5 TABLET ORAL at 19:45

## 2021-07-22 RX ADMIN — AMLODIPINE BESYLATE 5 MILLIGRAM(S): 2.5 TABLET ORAL at 04:11

## 2021-07-22 RX ADMIN — OXYCODONE HYDROCHLORIDE 5 MILLIGRAM(S): 5 TABLET ORAL at 00:43

## 2021-07-22 RX ADMIN — Medication 2000 MILLIGRAM(S): at 03:59

## 2021-07-22 RX ADMIN — OXYCODONE HYDROCHLORIDE 10 MILLIGRAM(S): 5 TABLET ORAL at 23:15

## 2021-07-22 RX ADMIN — Medication 325 MILLIGRAM(S): at 11:05

## 2021-07-22 RX ADMIN — Medication 975 MILLIGRAM(S): at 06:39

## 2021-07-22 RX ADMIN — ATORVASTATIN CALCIUM 20 MILLIGRAM(S): 80 TABLET, FILM COATED ORAL at 22:13

## 2021-07-22 RX ADMIN — OXYCODONE HYDROCHLORIDE 10 MILLIGRAM(S): 5 TABLET ORAL at 22:15

## 2021-07-22 RX ADMIN — OXYCODONE HYDROCHLORIDE 5 MILLIGRAM(S): 5 TABLET ORAL at 01:43

## 2021-07-22 NOTE — DISCHARGE NOTE PROVIDER - HOSPITAL COURSE
Admitted  Surgery - right total knee replacement  Tiffanie-op Antibiotics  Pain control  DVT prophylaxis  OOB/Physical Therapy Admitted- 7-  Surgery - right total knee replacement  Tiffanie-op Antibiotics  Pain control  DVT prophylaxis  OOB/Physical Therapy

## 2021-07-22 NOTE — DISCHARGE NOTE PROVIDER - NSDCCPTREATMENT_GEN_ALL_CORE_FT
PRINCIPAL PROCEDURE  Procedure: Total knee replacement  Findings and Treatment:        PRINCIPAL PROCEDURE  Procedure: Total knee replacement  Findings and Treatment: right knee arthritis

## 2021-07-22 NOTE — DISCHARGE NOTE PROVIDER - CARE PROVIDER_API CALL
Cali Mccain)  Orthopaedic Surgery  [address] [facility], [address] [place] [address]  Phone: [phone]  Fax: [phone]  Follow Up Time:

## 2021-07-22 NOTE — DISCHARGE NOTE PROVIDER - NSDCMRMEDTOKEN_GEN_ALL_CORE_FT
amLODIPine 2.5 mg oral tablet: 1 tab(s) orally once a day  amLODIPine 5 mg oral tablet: 1 tab(s) orally once a day  Crestor 5 mg oral tablet: 1 tab(s) orally once a day  Tylenol 325 mg oral tablet: 2 tab(s) orally every 4 hours, As Needed   amLODIPine 2.5 mg oral tablet: 1 tab(s) orally once a day  amLODIPine 5 mg oral tablet: 1 tab(s) orally once a day  aspirin 325 mg oral delayed release tablet: 1 tab(s) orally once a day MDD:1  Crestor 5 mg oral tablet: 1 tab(s) orally once a day  oxyCODONE 5 mg oral tablet: 1 tab(s) orally every 6 hours, As Needed -severe Pain (7 -10) MDD:4  senna oral tablet: 2 tab(s) orally once a day (at bedtime)  Tylenol 325 mg oral tablet: 2 tab(s) orally every 4 hours, As Needed MDD:12

## 2021-07-22 NOTE — DISCHARGE NOTE PROVIDER - NSDCCPCAREPLAN_GEN_ALL_CORE_FT
PRINCIPAL DISCHARGE DIAGNOSIS  Diagnosis: Arthritis  Assessment and Plan of Treatment: Arthritis       PRINCIPAL DISCHARGE DIAGNOSIS  Diagnosis: Arthritis  Assessment and Plan of Treatment: s/p Right TKA

## 2021-07-22 NOTE — DISCHARGE NOTE PROVIDER - NSDCFUADDINST_GEN_ALL_CORE_FT
Weight bear as tolerated with assistive device.  No strenuous activity, heavy lifting, driving or returning to work until cleared by MD.  You may shower - dressing is water-resistant, no soaking in bathtubs.  Remove dressing after post op day 5-7, then leave incision open to air. Keep incision clean and dry.  Try to have regular bowel movements, take stool softener or laxative if necessary.  May take Pepcid or Prilosec for upset stomach.  May take Aleve or Naproxen if needed.  Swelling may travel all the way down leg to foot, this is normal and will subside in a few weeks.  Call to schedule an appt with Dr. Mccain for follow up, if you have staples or sutures they will be removed in office.  Contact your doctor if you experience: fever greater than 101.5, chills, chest pain, difficulty breathing, redness or excessive drainage around the incision, other concerns.  Follow up with your primary care provider.  Weight bear as tolerated with assistive device.  No strenuous activity, heavy lifting, driving, exercising or returning to work until cleared by MD.  You may shower - dressing is water-resistant, no soaking in bathtubs.  Remove dressing after post op day 5-7, then leave incision open to air. Keep incision clean and dry.  Try to have regular bowel movements, take stool softener or laxative if necessary.  May take Pepcid or Prilosec for upset stomach.  May take Aleve or Naproxen if needed.  Swelling may travel all the way down leg to foot, this is normal and will subside in a few weeks.  Call to schedule an appt with Dr. Mccain for follow up, if you have staples or sutures they will be removed in office.  Contact your doctor if you experience: fever greater than 101.5, chills, chest pain, difficulty breathing, redness or excessive drainage around the incision, other concerns.  Follow up with your primary care provider.   If your left wrist or right knee pain continues to worsen or get swollen please follow up with your surgeon.

## 2021-07-23 ENCOUNTER — TRANSCRIPTION ENCOUNTER (OUTPATIENT)
Age: 81
End: 2021-07-23

## 2021-07-23 VITALS
RESPIRATION RATE: 16 BRPM | OXYGEN SATURATION: 98 % | TEMPERATURE: 98 F | HEART RATE: 70 BPM | SYSTOLIC BLOOD PRESSURE: 130 MMHG | DIASTOLIC BLOOD PRESSURE: 72 MMHG

## 2021-07-23 LAB
ANION GAP SERPL CALC-SCNC: 8 MMOL/L — SIGNIFICANT CHANGE UP (ref 5–17)
BUN SERPL-MCNC: 17 MG/DL — SIGNIFICANT CHANGE UP (ref 7–23)
CALCIUM SERPL-MCNC: 9.3 MG/DL — SIGNIFICANT CHANGE UP (ref 8.4–10.5)
CHLORIDE SERPL-SCNC: 103 MMOL/L — SIGNIFICANT CHANGE UP (ref 96–108)
CO2 SERPL-SCNC: 28 MMOL/L — SIGNIFICANT CHANGE UP (ref 22–31)
CREAT SERPL-MCNC: 0.88 MG/DL — SIGNIFICANT CHANGE UP (ref 0.5–1.3)
GLUCOSE SERPL-MCNC: 108 MG/DL — HIGH (ref 70–99)
HCT VFR BLD CALC: 37.4 % — SIGNIFICANT CHANGE UP (ref 34.5–45)
HGB BLD-MCNC: 11.8 G/DL — SIGNIFICANT CHANGE UP (ref 11.5–15.5)
MCHC RBC-ENTMCNC: 28.7 PG — SIGNIFICANT CHANGE UP (ref 27–34)
MCHC RBC-ENTMCNC: 31.6 GM/DL — LOW (ref 32–36)
MCV RBC AUTO: 91 FL — SIGNIFICANT CHANGE UP (ref 80–100)
NRBC # BLD: 0 /100 WBCS — SIGNIFICANT CHANGE UP (ref 0–0)
PLATELET # BLD AUTO: 217 K/UL — SIGNIFICANT CHANGE UP (ref 150–400)
POTASSIUM SERPL-MCNC: 3.9 MMOL/L — SIGNIFICANT CHANGE UP (ref 3.5–5.3)
POTASSIUM SERPL-SCNC: 3.9 MMOL/L — SIGNIFICANT CHANGE UP (ref 3.5–5.3)
RBC # BLD: 4.11 M/UL — SIGNIFICANT CHANGE UP (ref 3.8–5.2)
RBC # FLD: 13.7 % — SIGNIFICANT CHANGE UP (ref 10.3–14.5)
SODIUM SERPL-SCNC: 139 MMOL/L — SIGNIFICANT CHANGE UP (ref 135–145)
WBC # BLD: 7.28 K/UL — SIGNIFICANT CHANGE UP (ref 3.8–10.5)
WBC # FLD AUTO: 7.28 K/UL — SIGNIFICANT CHANGE UP (ref 3.8–10.5)

## 2021-07-23 PROCEDURE — 36415 COLL VENOUS BLD VENIPUNCTURE: CPT

## 2021-07-23 PROCEDURE — C1713: CPT

## 2021-07-23 PROCEDURE — 73560 X-RAY EXAM OF KNEE 1 OR 2: CPT

## 2021-07-23 PROCEDURE — 73110 X-RAY EXAM OF WRIST: CPT | Mod: 26,LT

## 2021-07-23 PROCEDURE — 97116 GAIT TRAINING THERAPY: CPT

## 2021-07-23 PROCEDURE — 73130 X-RAY EXAM OF HAND: CPT | Mod: 26,LT

## 2021-07-23 PROCEDURE — 86769 SARS-COV-2 COVID-19 ANTIBODY: CPT

## 2021-07-23 PROCEDURE — 97161 PT EVAL LOW COMPLEX 20 MIN: CPT

## 2021-07-23 PROCEDURE — 73130 X-RAY EXAM OF HAND: CPT

## 2021-07-23 PROCEDURE — 73564 X-RAY EXAM KNEE 4 OR MORE: CPT

## 2021-07-23 PROCEDURE — C1776: CPT

## 2021-07-23 PROCEDURE — 73564 X-RAY EXAM KNEE 4 OR MORE: CPT | Mod: 26,RT

## 2021-07-23 PROCEDURE — 73110 X-RAY EXAM OF WRIST: CPT

## 2021-07-23 PROCEDURE — 80048 BASIC METABOLIC PNL TOTAL CA: CPT

## 2021-07-23 PROCEDURE — 81001 URINALYSIS AUTO W/SCOPE: CPT

## 2021-07-23 PROCEDURE — 85027 COMPLETE CBC AUTOMATED: CPT

## 2021-07-23 PROCEDURE — 85610 PROTHROMBIN TIME: CPT

## 2021-07-23 PROCEDURE — 85730 THROMBOPLASTIN TIME PARTIAL: CPT

## 2021-07-23 RX ORDER — ACETAMINOPHEN 500 MG
2 TABLET ORAL
Qty: 0 | Refills: 0 | DISCHARGE

## 2021-07-23 RX ORDER — SENNA PLUS 8.6 MG/1
2 TABLET ORAL
Qty: 0 | Refills: 0 | DISCHARGE
Start: 2021-07-23

## 2021-07-23 RX ORDER — ACETAMINOPHEN 500 MG
2 TABLET ORAL
Qty: 84 | Refills: 0
Start: 2021-07-23 | End: 2021-07-29

## 2021-07-23 RX ORDER — OXYCODONE HYDROCHLORIDE 5 MG/1
1 TABLET ORAL
Qty: 28 | Refills: 0
Start: 2021-07-23 | End: 2021-07-29

## 2021-07-23 RX ORDER — ASPIRIN/CALCIUM CARB/MAGNESIUM 324 MG
1 TABLET ORAL
Qty: 10 | Refills: 0
Start: 2021-07-23 | End: 2021-08-01

## 2021-07-23 RX ADMIN — Medication 975 MILLIGRAM(S): at 06:06

## 2021-07-23 RX ADMIN — OXYCODONE HYDROCHLORIDE 5 MILLIGRAM(S): 5 TABLET ORAL at 14:13

## 2021-07-23 RX ADMIN — Medication 975 MILLIGRAM(S): at 14:12

## 2021-07-23 RX ADMIN — OXYCODONE HYDROCHLORIDE 10 MILLIGRAM(S): 5 TABLET ORAL at 06:06

## 2021-07-23 RX ADMIN — AMLODIPINE BESYLATE 5 MILLIGRAM(S): 2.5 TABLET ORAL at 06:06

## 2021-07-23 RX ADMIN — OXYCODONE HYDROCHLORIDE 5 MILLIGRAM(S): 5 TABLET ORAL at 13:13

## 2021-07-23 RX ADMIN — OXYCODONE HYDROCHLORIDE 10 MILLIGRAM(S): 5 TABLET ORAL at 07:06

## 2021-07-23 RX ADMIN — Medication 975 MILLIGRAM(S): at 13:12

## 2021-07-23 RX ADMIN — Medication 975 MILLIGRAM(S): at 07:06

## 2021-07-23 RX ADMIN — Medication 325 MILLIGRAM(S): at 13:11

## 2021-07-23 NOTE — CHART NOTE - NSCHARTNOTEFT_GEN_A_CORE
POST FALL ASSESSMENT    Physical Exam:    BP:  [154/87 ]          Pulse:  [ 87  ]               Pulse Ox:  [  95 %]     RR:  [    16      ]                 Temperature:  [     97.7       ]     Findings:  Pt had unwitnessed fall when trying ambulate by herself - braced wall and slid down to ground. No head strike, not complaining of any localized pain apart from mild pain to L wrist - no tenderness, full pain free ROM.    Imaging Ordered STAT:  None    Medication Review  [  ] Anesthetic                                                   [  ] Digoxin                                                           [  ] Antidepressant  [  ] Diuretic                                                        [  ] Antihistamine                                                [  ] Hypoglycemic  [x] Anti-hypertensive                                      [x] Narcotic                                                          [  ] Anti-seizure  [  ] NSAID                                                           [  ] Anticoagulant                                                [  ] Psychotropic  [  ] Antiplatelet (e.g. aspirin, clopidogrel)    [  ] Sedative/Hypnotic                                        [  ] Benzodiazepine  [  ] Cathartic / Laxative                                    [  ] Patient currently on 6 or more meds    Adjustment Made to Medication:  [x] No

## 2021-07-23 NOTE — PROGRESS NOTE ADULT - SUBJECTIVE AND OBJECTIVE BOX
Ortho Note    Subjective:  Pt comfortable without complaints, pain controlled. Had near fall last night around 8:30pm but was able to brace herself on wall. Ended up laying on ground and was promptly helped back to bed by nursing. Denies any new pain, bruising, or tenderness afterwards.    Denies CP, SOB, N/V, numbness/tingling       Vital Signs Last 24 Hrs  T(C): 36.4 (23 Jul 2021 04:52), Max: 36.5 (22 Jul 2021 15:36)  T(F): 97.6 (23 Jul 2021 04:52), Max: 97.7 (22 Jul 2021 15:36)  HR: 69 (23 Jul 2021 04:52) (69 - 87)  BP: 122/81 (23 Jul 2021 04:52) (122/81 - 158/89)  BP(mean): 95 (23 Jul 2021 04:52) (95 - 109)  RR: 16 (23 Jul 2021 04:52) (16 - 16)  SpO2: 100% (23 Jul 2021 04:52) (95% - 100%)  AVSS      Objective:    Physical Exam:  General: Pt Alert and oriented, NAD  BL UE and LE palpated and ROM'd without pain  Right knee aquacel DSG C/D/I  Pulses: +2 pedal pulses, wwp toes, cap refill less than 3 seconds  Sensation: silt intact  Motor: EHL/FHL/TA/GS- firing        Plan of Care:  A/P: 81yFemale s/p Right TKA 7/21  - afebrile, nontoxic apperance  - Pain Control  - DVT ppx: aspirin 325mg PO Daily, SCDS  - PT, WBS: WBAT  - Dispo: home with home pt pending pt clearance    Ortho Pager [phone]
Detail Level: Simple
Ortho Note    Subjective:  Pt comfortable without complaints, pain controlled with current pain medication regimen. Patient with Right knee aquacel, c,d,i.   Denies CP, SOB, N/V, numbness/tingling       Vital Signs Last 24 Hrs  T(C): 36.4 (07-22-21 @ 08:27), Max: 36.4 (07-22-21 @ 08:27)  T(F): 97.6 (07-22-21 @ 08:27), Max: 97.6 (07-22-21 @ 08:27)  HR: 74 (07-22-21 @ 08:27) (74 - 74)  BP: 158/89 (07-22-21 @ 08:27) (158/89 - 159/89)  BP(mean): --  RR: 16 (07-22-21 @ 08:27) (16 - 16)  SpO2: 99% (07-22-21 @ 08:27) (99% - 99%)  AVSS      Objective:    Physical Exam:  General: Pt Alert and oriented, NAD  Right knee aquacel DSG C/D/I  Pulses: +2 pedal pulses, wwp toes, cap refill less than 3 seconds  Sensation: silt intact  Motor: EHL/FHL/TA/GS- firing        Plan of Care:  A/P: 81yFemale POD#1 s/p Right TKA  - afebrile, nontoxic apperance  - Pain Control-  tylenol 975mg Po Q8h, Oxycodone 5-10mg PO Q3h prn moderate to severe pain, Tramadol 50mg PO Q6h prn mild pain   - DVT ppx: aspirin 325mg PO Daily, SCDS  - PT, WBS: WBAT  - Dispo: home with home pt pending pt clearance    Ortho Pager [phone]
Orthopaedics Progress Note    Pt comfortable, without complaints  Denies CP, SOB, N/V, numbness/tingling     Vital Signs Last 24 Hrs  T(C): 36.4 (22 Jul 2021 04:05), Max: 36.5 (21 Jul 2021 20:22)  T(F): 97.5 (22 Jul 2021 04:05), Max: 97.7 (21 Jul 2021 20:22)  HR: 71 (22 Jul 2021 04:05) (58 - 88)  BP: 159/89 (22 Jul 2021 05:30) (132/73 - 171/91)  BP(mean): 102 (21 Jul 2021 15:48) (96 - 105)  RR: 18 (22 Jul 2021 04:05) (16 - 56)  SpO2: 95% (22 Jul 2021 04:05) (93% - 100%)  AVSS, NAD    Dressing C/D/I  General: Pt Alert and oriented     Pulses: DP pulses palpable bilaterally   Sensation: SLT in tact to distal right lower extremity   Motor: EHL/FHL/TA/GS 5/5 motor strength bilateral lower extremities         A/P: 81yFemale s/p right TKR on 7/21  - Stable  - Pain Control  - DVT ppx: ASA, SCDs  - Post op abx: Ancef   - PT, WBS: WBAT  - F/U AM Labs
Ortho Note    Subjective:  Pt reporting Left wrist pain after a near fall last night. Left wrist is tender to touch, non edematous or swollen.   Denies CP, SOB, N/V, numbness/tingling       Vital Signs Last 24 Hrs  T(C): 36.3 (07-23-21 @ 09:14), Max: 36.4 (07-23-21 @ 04:52)  T(F): 97.3 (07-23-21 @ 09:14), Max: 97.6 (07-23-21 @ 04:52)  HR: 80 (07-23-21 @ 09:14) (69 - 80)  BP: 123/71 (07-23-21 @ 09:14) (122/81 - 123/71)  BP(mean): 95 (07-23-21 @ 04:52) (95 - 95)  RR: 18 (07-23-21 @ 09:14) (16 - 18)  SpO2: 98% (07-23-21 @ 09:14) (98% - 100%)  AVSS      Objective:    Physical Exam:  General: Pt Alert and oriented, NAD  Right knee aquacel DSG C/D/I  Pulses: +2 pedal pulses, wwp toes, cap refill less than 3 seconds  Sensation: silt intact  Motor: EHL/FHL/TA/GS- firing        Plan of Care:  A/P: 81yFemale POD#2 s/p Right TKA  - afebrile, nontoxic appearance  - Pain Control- tylenol 975mg Po Q8h, Oxycodone 5-10mg PO Q3h prn moderate to severe pain, Tramadol 50mg PO Q6h prn mild pain   - DVT ppx: SCDS, aspirin 325mg PO Daily  - PT, WBS: WBAT   - bowel regimen, IS use  - Left wrist Xray  - Dispo: home with home pt, pending pt clearance and medical clearance    Ortho Pager [phone]
Orthopaedics Post Op Check    Procedure: Right total knee replacement   Surgeon: Dr. Mccain     Pt comfortable, without complaints  Denies CP, SOB, N/V, numbness/tingling     Vital Signs Last 24 Hrs  T(C): 36.3 (21 Jul 2021 14:03), Max: 36.3 (21 Jul 2021 14:03)  T(F): 97.4 (21 Jul 2021 14:03), Max: 97.4 (21 Jul 2021 14:03)  HR: 58 (21 Jul 2021 15:03) (58 - 68)  BP: 139/83 (21 Jul 2021 15:03) (132/73 - 152/77)  BP(mean): 105 (21 Jul 2021 15:03) (96 - 105)  RR: 47 (21 Jul 2021 15:03) (35 - 56)  SpO2: 99% (21 Jul 2021 15:03) (97% - 100%)  AVSS, NAD    Dressing C/D/I  General: Pt Alert and oriented     Pulses: DP pulses palpable bilaterally   Sensation: SLT in tact to distal right lower extremity   Motor: EHL/FHL/TA/GS 5/5 motor strength bilateral lower extremities       Post op XR: right knee prosthesis in place     A/P: 81yFemale POD#0 s/p right TKR   - Stable  - Pain Control  - DVT ppx: ASA, SCDs  - Post op abx: Ancef   - PT, WBS: WBAT  - F/U AM Labs
Detail Level: Zone

## 2021-07-23 NOTE — DISCHARGE NOTE NURSING/CASE MANAGEMENT/SOCIAL WORK - PATIENT PORTAL LINK FT
You can access the FollowMyHealth Patient Portal offered by [facility] by registering at the following website: [email or web address]. By joining [employer]’s FollowMyHealth portal, you will also be able to view your health information using other applications (apps) compatible with our system.

## 2021-07-25 ENCOUNTER — INPATIENT (INPATIENT)
Facility: HOSPITAL | Age: 81
LOS: 4 days | Discharge: NURSING, REHAB OR LONG-TERM CARE | DRG: 93 | End: 2021-07-30
Attending: HOSPITALIST | Admitting: HOSPITALIST
Payer: MEDICARE

## 2021-07-25 VITALS
OXYGEN SATURATION: 99 % | DIASTOLIC BLOOD PRESSURE: 91 MMHG | RESPIRATION RATE: 17 BRPM | WEIGHT: 153 LBS | TEMPERATURE: 98 F | SYSTOLIC BLOOD PRESSURE: 159 MMHG | HEIGHT: 64 IN | HEART RATE: 87 BPM

## 2021-07-25 DIAGNOSIS — N18.30 CHRONIC KIDNEY DISEASE, STAGE 3 UNSPECIFIED: ICD-10-CM

## 2021-07-25 DIAGNOSIS — R63.8 OTHER SYMPTOMS AND SIGNS CONCERNING FOOD AND FLUID INTAKE: ICD-10-CM

## 2021-07-25 DIAGNOSIS — K21.9 GASTRO-ESOPHAGEAL REFLUX DISEASE WITHOUT ESOPHAGITIS: ICD-10-CM

## 2021-07-25 DIAGNOSIS — I10 ESSENTIAL (PRIMARY) HYPERTENSION: ICD-10-CM

## 2021-07-25 DIAGNOSIS — M19.90 UNSPECIFIED OSTEOARTHRITIS, UNSPECIFIED SITE: ICD-10-CM

## 2021-07-25 DIAGNOSIS — M17.12 UNILATERAL PRIMARY OSTEOARTHRITIS, LEFT KNEE: ICD-10-CM

## 2021-07-25 DIAGNOSIS — I12.9 HYPERTENSIVE CHRONIC KIDNEY DISEASE WITH STAGE 1 THROUGH STAGE 4 CHRONIC KIDNEY DISEASE, OR UNSPECIFIED CHRONIC KIDNEY DISEASE: ICD-10-CM

## 2021-07-25 DIAGNOSIS — N18.9 CHRONIC KIDNEY DISEASE, UNSPECIFIED: ICD-10-CM

## 2021-07-25 DIAGNOSIS — Z87.891 PERSONAL HISTORY OF NICOTINE DEPENDENCE: ICD-10-CM

## 2021-07-25 DIAGNOSIS — Z96.651 PRESENCE OF RIGHT ARTIFICIAL KNEE JOINT: Chronic | ICD-10-CM
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
## 2021-07-25 DIAGNOSIS — M17.11 UNILATERAL PRIMARY OSTEOARTHRITIS, RIGHT KNEE: ICD-10-CM

## 2021-07-25 DIAGNOSIS — E78.5 HYPERLIPIDEMIA, UNSPECIFIED: ICD-10-CM

## 2021-07-25 DIAGNOSIS — M25.561 PAIN IN RIGHT KNEE: ICD-10-CM

## 2021-07-25 LAB
ALBUMIN SERPL ELPH-MCNC: 3.3 G/DL — SIGNIFICANT CHANGE UP (ref 3.3–5)
ALP SERPL-CCNC: 92 U/L — SIGNIFICANT CHANGE UP (ref 40–120)
ALT FLD-CCNC: 14 U/L — SIGNIFICANT CHANGE UP (ref 10–45)
ANION GAP SERPL CALC-SCNC: 9 MMOL/L — SIGNIFICANT CHANGE UP (ref 5–17)
APPEARANCE UR: CLEAR — SIGNIFICANT CHANGE UP
APTT BLD: 22 SEC — LOW (ref 27.5–35.5)
AST SERPL-CCNC: 27 U/L — SIGNIFICANT CHANGE UP (ref 10–40)
BASOPHILS # BLD AUTO: 0.01 K/UL — SIGNIFICANT CHANGE UP (ref 0–0.2)
BASOPHILS NFR BLD AUTO: 0.1 % — SIGNIFICANT CHANGE UP (ref 0–2)
BILIRUB SERPL-MCNC: 0.8 MG/DL — SIGNIFICANT CHANGE UP (ref 0.2–1.2)
BILIRUB UR-MCNC: NEGATIVE — SIGNIFICANT CHANGE UP
BUN SERPL-MCNC: 22 MG/DL — SIGNIFICANT CHANGE UP (ref 7–23)
CALCIUM SERPL-MCNC: 9.6 MG/DL — SIGNIFICANT CHANGE UP (ref 8.4–10.5)
CHLORIDE SERPL-SCNC: 98 MMOL/L — SIGNIFICANT CHANGE UP (ref 96–108)
CO2 SERPL-SCNC: 28 MMOL/L — SIGNIFICANT CHANGE UP (ref 22–31)
COLOR SPEC: YELLOW — SIGNIFICANT CHANGE UP
CREAT SERPL-MCNC: 1.06 MG/DL — SIGNIFICANT CHANGE UP (ref 0.5–1.3)
DIFF PNL FLD: NEGATIVE — SIGNIFICANT CHANGE UP
EOSINOPHIL # BLD AUTO: 0.01 K/UL — SIGNIFICANT CHANGE UP (ref 0–0.5)
EOSINOPHIL NFR BLD AUTO: 0.1 % — SIGNIFICANT CHANGE UP (ref 0–6)
GLUCOSE SERPL-MCNC: 105 MG/DL — HIGH (ref 70–99)
GLUCOSE UR QL: NEGATIVE — SIGNIFICANT CHANGE UP
HCT VFR BLD CALC: 35.4 % — SIGNIFICANT CHANGE UP (ref 34.5–45)
HGB BLD-MCNC: 11.3 G/DL — LOW (ref 11.5–15.5)
IMM GRANULOCYTES NFR BLD AUTO: 0.6 % — SIGNIFICANT CHANGE UP (ref 0–1.5)
INR BLD: 1.05 — SIGNIFICANT CHANGE UP (ref 0.88–1.16)
KETONES UR-MCNC: ABNORMAL MG/DL
LACTATE SERPL-SCNC: 1.9 MMOL/L — SIGNIFICANT CHANGE UP (ref 0.5–2)
LEUKOCYTE ESTERASE UR-ACNC: NEGATIVE — SIGNIFICANT CHANGE UP
LYMPHOCYTES # BLD AUTO: 1.45 K/UL — SIGNIFICANT CHANGE UP (ref 1–3.3)
LYMPHOCYTES # BLD AUTO: 17.4 % — SIGNIFICANT CHANGE UP (ref 13–44)
MCHC RBC-ENTMCNC: 28.3 PG — SIGNIFICANT CHANGE UP (ref 27–34)
MCHC RBC-ENTMCNC: 31.9 GM/DL — LOW (ref 32–36)
MCV RBC AUTO: 88.5 FL — SIGNIFICANT CHANGE UP (ref 80–100)
MONOCYTES # BLD AUTO: 1.34 K/UL — HIGH (ref 0–0.9)
MONOCYTES NFR BLD AUTO: 16.1 % — HIGH (ref 2–14)
NEUTROPHILS # BLD AUTO: 5.48 K/UL — SIGNIFICANT CHANGE UP (ref 1.8–7.4)
NEUTROPHILS NFR BLD AUTO: 65.7 % — SIGNIFICANT CHANGE UP (ref 43–77)
NITRITE UR-MCNC: NEGATIVE — SIGNIFICANT CHANGE UP
NRBC # BLD: 0 /100 WBCS — SIGNIFICANT CHANGE UP (ref 0–0)
PH UR: 6 — SIGNIFICANT CHANGE UP (ref 5–8)
PLATELET # BLD AUTO: 214 K/UL — SIGNIFICANT CHANGE UP (ref 150–400)
POTASSIUM SERPL-MCNC: 4.7 MMOL/L — SIGNIFICANT CHANGE UP (ref 3.5–5.3)
POTASSIUM SERPL-SCNC: 4.7 MMOL/L — SIGNIFICANT CHANGE UP (ref 3.5–5.3)
PROT SERPL-MCNC: 6.7 G/DL — SIGNIFICANT CHANGE UP (ref 6–8.3)
PROT UR-MCNC: NEGATIVE MG/DL — SIGNIFICANT CHANGE UP
PROTHROM AB SERPL-ACNC: 12.6 SEC — SIGNIFICANT CHANGE UP (ref 10.6–13.6)
RBC # BLD: 4 M/UL — SIGNIFICANT CHANGE UP (ref 3.8–5.2)
RBC # FLD: 13.5 % — SIGNIFICANT CHANGE UP (ref 10.3–14.5)
SARS-COV-2 RNA SPEC QL NAA+PROBE: NEGATIVE — SIGNIFICANT CHANGE UP
SODIUM SERPL-SCNC: 135 MMOL/L — SIGNIFICANT CHANGE UP (ref 135–145)
SP GR SPEC: 1.01 — SIGNIFICANT CHANGE UP (ref 1–1.03)
UROBILINOGEN FLD QL: 0.2 E.U./DL — SIGNIFICANT CHANGE UP
WBC # BLD: 8.34 K/UL — SIGNIFICANT CHANGE UP (ref 3.8–10.5)
WBC # FLD AUTO: 8.34 K/UL — SIGNIFICANT CHANGE UP (ref 3.8–10.5)

## 2021-07-25 PROCEDURE — 99223 1ST HOSP IP/OBS HIGH 75: CPT | Mod: GC

## 2021-07-25 PROCEDURE — 93971 EXTREMITY STUDY: CPT | Mod: 26,RT

## 2021-07-25 PROCEDURE — 99285 EMERGENCY DEPT VISIT HI MDM: CPT | Mod: 25

## 2021-07-25 PROCEDURE — 71046 X-RAY EXAM CHEST 2 VIEWS: CPT | Mod: 26

## 2021-07-25 PROCEDURE — 93010 ELECTROCARDIOGRAM REPORT: CPT

## 2021-07-25 PROCEDURE — 73564 X-RAY EXAM KNEE 4 OR MORE: CPT | Mod: 26,RT

## 2021-07-25 PROCEDURE — 70450 CT HEAD/BRAIN W/O DYE: CPT | Mod: 26,MA

## 2021-07-25 RX ORDER — OXYCODONE HYDROCHLORIDE 5 MG/1
10 TABLET ORAL EVERY 4 HOURS
Refills: 0 | Status: DISCONTINUED | OUTPATIENT
Start: 2021-07-25 | End: 2021-07-30

## 2021-07-25 RX ORDER — ACETAMINOPHEN 500 MG
650 TABLET ORAL EVERY 4 HOURS
Refills: 0 | Status: DISCONTINUED | OUTPATIENT
Start: 2021-07-25 | End: 2021-07-30

## 2021-07-25 RX ORDER — SODIUM CHLORIDE 9 MG/ML
1000 INJECTION INTRAMUSCULAR; INTRAVENOUS; SUBCUTANEOUS ONCE
Refills: 0 | Status: COMPLETED | OUTPATIENT
Start: 2021-07-25 | End: 2021-07-25

## 2021-07-25 RX ORDER — AMLODIPINE BESYLATE 2.5 MG/1
1 TABLET ORAL
Qty: 0 | Refills: 0 | DISCHARGE

## 2021-07-25 RX ORDER — IBUPROFEN 200 MG
600 TABLET ORAL EVERY 6 HOURS
Refills: 0 | Status: DISCONTINUED | OUTPATIENT
Start: 2021-07-25 | End: 2021-07-25

## 2021-07-25 RX ORDER — ENOXAPARIN SODIUM 100 MG/ML
30 INJECTION SUBCUTANEOUS EVERY 12 HOURS
Refills: 0 | Status: DISCONTINUED | OUTPATIENT
Start: 2021-07-25 | End: 2021-07-26

## 2021-07-25 RX ORDER — AMLODIPINE BESYLATE 2.5 MG/1
5 TABLET ORAL DAILY
Refills: 0 | Status: DISCONTINUED | OUTPATIENT
Start: 2021-07-25 | End: 2021-07-26

## 2021-07-25 RX ORDER — OXYCODONE HYDROCHLORIDE 5 MG/1
5 TABLET ORAL EVERY 6 HOURS
Refills: 0 | Status: DISCONTINUED | OUTPATIENT
Start: 2021-07-25 | End: 2021-07-30

## 2021-07-25 RX ADMIN — SODIUM CHLORIDE 1000 MILLILITER(S): 9 INJECTION INTRAMUSCULAR; INTRAVENOUS; SUBCUTANEOUS at 13:41

## 2021-07-25 RX ADMIN — Medication 0.5 MILLIGRAM(S): at 19:06

## 2021-07-25 NOTE — H&P ADULT - PROBLEM SELECTOR PLAN 8
F: None   E: Replete for K<4 Mag<2  N: Regular Diet  A: Lovenox  Code status: full  Dispo: regular medical floor

## 2021-07-25 NOTE — H&P ADULT - PROBLEM SELECTOR PLAN 2
S/p right knee replacement. Reported cause for patient's falls post-op. Associated redness and swelling. No fever, leukocytosis. hemodynamically stable.  - LE doppler US negative for right leg DVT  - PT consult  - Pain management as above  - Lovenox DVT ppx S/p right knee replacement. Reported cause for patient's falls post-op. Associated redness and swelling. No fever, leukocytosis. hemodynamically stable.  - LE doppler US negative for right leg DVT  - PT consult  - Pain management as above  - Lovenox DVT ppx  - Resume Aspirin 325mg DVT ppx on discharge

## 2021-07-25 NOTE — CONSULT NOTE ADULT - SUBJECTIVE AND OBJECTIVE BOX
Orthopaedic Surgery Consult Note    For Surgeon:    HPI:  81yFemale  Patient is a 81y old  Female who presents with a chief complaint of   HPI:      Allergies    No Known Allergies    Intolerances      PAST MEDICAL & SURGICAL HISTORY:  GERD (gastroesophageal reflux disease)    HTN (hypertension)    Arthritis  both knees    H/O total knee replacement, right      MEDICATIONS  (STANDING):    MEDICATIONS  (PRN):      Vital Signs Last 24 Hrs  T(C): 36.5 (25 Jul 2021 12:45), Max: 36.5 (25 Jul 2021 12:45)  T(F): 97.7 (25 Jul 2021 12:45), Max: 97.7 (25 Jul 2021 12:45)  HR: 87 (25 Jul 2021 12:45) (87 - 87)  BP: 159/91 (25 Jul 2021 12:45) (159/91 - 159/91)  BP(mean): --  RR: 17 (25 Jul 2021 12:45) (17 - 17)  SpO2: 99% (25 Jul 2021 12:45) (99% - 99%)    Physical Exam:                          11.3   8.34  )-----------( 214      ( 25 Jul 2021 13:30 )             35.4     07-25    135  |  98  |  22  ----------------------------<  105<H>  4.7   |  28  |  1.06    Ca    9.6      25 Jul 2021 13:30    TPro  6.7  /  Alb  3.3  /  TBili  0.8  /  DBili  x   /  AST  27  /  ALT  14  /  AlkPhos  92  07-25    PT/INR - ( 25 Jul 2021 13:30 )   PT: 12.6 sec;   INR: 1.05          PTT - ( 25 Jul 2021 13:30 )  PTT:22.0 sec  Imaging:     A/P: 81yFemale    -Discussed with         Orthopaedic Surgery Consult Note    For Surgeon: Adán    HPI:  81F hx HTN and GERD s/p R TKA 7/21 and discharged home with home PT 7/23 presenting to the ED s/p multiple falls and R knee pain. Reports that she feels dizzy and falls. Unknown head trauma or LOC. No injuries. Denies trauma to the R knee, numbness or tingling. States that she had similar fall episodes about a year ago but was never worked up.      Allergies    No Known Allergies    Intolerances      PAST MEDICAL & SURGICAL HISTORY:  GERD (gastroesophageal reflux disease)    HTN (hypertension)    Arthritis  both knees    H/O total knee replacement, right      MEDICATIONS  (STANDING):    MEDICATIONS  (PRN):      Vital Signs Last 24 Hrs  T(C): 36.5 (25 Jul 2021 12:45), Max: 36.5 (25 Jul 2021 12:45)  T(F): 97.7 (25 Jul 2021 12:45), Max: 97.7 (25 Jul 2021 12:45)  HR: 87 (25 Jul 2021 12:45) (87 - 87)  BP: 159/91 (25 Jul 2021 12:45) (159/91 - 159/91)  BP(mean): --  RR: 17 (25 Jul 2021 12:45) (17 - 17)  SpO2: 99% (25 Jul 2021 12:45) (99% - 99%)    Physical Exam:  Gen: Pleasant appearing in no acute distress  R knee aquacell C/D/I  Mild postop swelling  Sensation intact s/s/sp/dp/t and symmetric   EHL/FHL/TA/GS 5/5 and symmetric   2+ DP/PT pulse   Toes WWP  Rest of trauma survey negative                         11.3   8.34  )-----------( 214      ( 25 Jul 2021 13:30 )             35.4     07-25    135  |  98  |  22  ----------------------------<  105<H>  4.7   |  28  |  1.06    Ca    9.6      25 Jul 2021 13:30    TPro  6.7  /  Alb  3.3  /  TBili  0.8  /  DBili  x   /  AST  27  /  ALT  14  /  AlkPhos  92  07-25    PT/INR - ( 25 Jul 2021 13:30 )   PT: 12.6 sec;   INR: 1.05          PTT - ( 25 Jul 2021 13:30 )  PTT:22.0 sec  Imaging:   Xray R knee shows hardware intact with good alignment     A/P: 81yFemale s/p R TKA 7/21 p/w multiple falls     - R knee stable  - Rec:      - workup for multiple falls       - Pain control as needed      - WBAT       - PT      - Will continue to follow     -Discussed with Dr. Mccain

## 2021-07-25 NOTE — H&P ADULT - PROBLEM SELECTOR PROBLEM 6
Nutrition, metabolism, and development symptoms Chronic kidney disease (CKD), stage III (moderate) HTN (hypertension)

## 2021-07-25 NOTE — H&P ADULT - HISTORY OF PRESENT ILLNESS
81F w/ PMHx right knee replacement on July 21 and is post-op day 5 and discharged July 23, presents to the ED w/ daughter in law complaining of increasing edema to right leg and 3 falls since discharge. Pt states that last night she fell and hit her head. She has been on Aspirin 325 mg for DVT. Pt states that she experiences pain when she bears weight causing her to be unable to put weight on her leg. Pt currently lives alone without much help at home, as she has no other family around ever since her son passed away 3 months post-op period due to presumed blood clot. Pt did have physical therapy come to home and they noted that the pt had difficult getting up. Pt's daughter in law is concerned for pt as she is unable to care for self and is asking about possible rehab vs. increased home services.    In the ED:  Initial vital signs: T: 97.7 F, HR: 87, BP: 159/91, R: 17, SpO2: 99% on RA  Labs: significant for Hgb 11.3 (baseline 13), CMP wnl  Imaging:  CTH: No acute hemorrhage or calvarial fracture  CXR: None  US Dopplers: No RLE DVT  EKG: NSR  Medications: Lorazepam 0.5, NS 1L  Consults: Orthopedics 81F w/ PMHx HTN, HLD, and osteoarthritis s/p right TKR on July 21 POD5, presents to the ED complaining of 3 falls since the surgery due to pain in her right knee when she attempts to ambulate. Prior to surgery, she was able to ambulate with a walker. Her first fall was in the hospital the day after surgery, when she tried to get up to go to the bathroom using her walker but fell to the floor due to inability to bear weight on right knee. She was discharged to home with home PT, who noted that the pt had difficulty standing up. She also fell at home on POD2, and she fell this morning at home and hit her head. She did not sustain any injuries or trauma from the falls. Denies any palpitations, lightheadedness, fainting, dizziness. Knee pain is controlled while she is lying down. Has taken tylenol for pain. She was prescribed tramadol and oxycodone for post-op pain but has not taken any. Patient currently lives at home alone. Fortunately her niece was nearby this weekend and was able to come to help her up after she fell down. She has been on Aspirin 325 mg for DVT ppx. Of note, her 50-year-old son passed away 3 months ago after suffering a leg injury while playing basketball and suffered a suspected PE during the post-op period. Pt's daughter-in-law at the bedside is concerned that the patient is unable to care for herself and is interested in rehab or increased home services. Patient denies f/c, blurry vision, CP, SOB, n/v/d/c, abd pain, change in BMs or urination, weakness or numbness in her legs.     Lives in the [place], former smoker, quit 50 years ago, denies alcohol or illicit drug use. PMD is Aidan Howard at [facility]    In the ED:  Initial vital signs: T: 97.7 F, HR: 87, BP: 159/91, R: 17, SpO2: 99% on RA  Labs: significant for Hgb 11.3 (baseline 13), CMP wnl  Imaging:  CTH: No acute hemorrhage or calvarial fracture  CXR: None  US Dopplers: No RLE DVT  EKG: NSR  Medications: Lorazepam 0.5, NS 1L  Consults: Orthopedics 81F w/ PMHx HTN, HLD, and osteoarthritis s/p right TKR on July 21 POD5, presents to the ED complaining of 3 falls since the surgery due to pain in her right knee when she attempts to ambulate. Prior to surgery, she was able to ambulate with a walker. Her first fall was in the hospital the day after surgery, when she tried to get up to go to the bathroom using her walker but fell to the floor due to inability to bear weight on right knee. She was discharged to home with home PT, who noted that the pt had difficulty standing up. She also fell at home on POD2, and she fell this morning at home and hit her head. She did not sustain any injuries or trauma from the falls. Denies any palpitations, lightheadedness, fainting, dizziness. Knee pain is controlled while she is lying down. Has taken tylenol for pain. She was prescribed tramadol and oxycodone for post-op pain but has not taken any. Patient currently lives at home alone. Fortunately her niece was nearby this weekend and was able to come to help her up after she fell down. She has been on Aspirin 325 mg for DVT ppx. Of note, her 50-year-old son passed away 3 months ago after a leg injury while playing basketball and suffered a suspected PE during the post-op period. Pt's daughter-in-law at the bedside is concerned that the patient is unable to care for herself and is interested in rehab or increased home services. Patient denies f/c, blurry vision, CP, SOB, n/v/d/c, abd pain, change in BMs or urination, weakness or numbness in her legs.     Lives in the [place], former smoker, quit 50 years ago, denies alcohol or illicit drug use. PMD is Aidan Howard at [facility]    In the ED:  Initial vital signs: T: 97.7 F, HR: 87, BP: 159/91, R: 17, SpO2: 99% on RA  Labs: significant for Hgb 11.3 (baseline 13), CMP wnl  Imaging:  CTH: No acute hemorrhage or calvarial fracture  CXR: None  US Dopplers: No RLE DVT  EKG: NSR  Medications: Lorazepam 0.5, NS 1L  Consults: Orthopedics 81F w/ PMHx HTN, HLD, and osteoarthritis s/p right TKR on July 21 POD5, presents to the ED complaining of 3 falls since the surgery due to pain in her right knee when she attempts to ambulate. Prior to surgery, she was able to ambulate with a walker. Her first fall was in the hospital the day after surgery, when she tried to get up to go to the bathroom using her walker but fell to the floor due to inability to bear weight on right knee. She was discharged to home with home PT, who noted that the pt had difficulty standing up. She also fell at home on POD2, and she fell this morning at home and hit her head. She did not sustain any injuries or trauma from the falls. Denies any palpitations, lightheadedness, fainting, dizziness. Knee pain is controlled while she is lying down. Has taken tylenol for pain. She was prescribed tramadol and oxycodone for post-op pain but has not taken any. Patient currently lives at home alone. Fortunately her niece was nearby this weekend and was able to come to help her up after she fell down. She has been on Aspirin 325 mg for DVT ppx. Pt's daughter-in-law at the bedside is concerned that the patient is unable to care for herself and is interested in rehab or increased home services. No history of DVTs or PEs. Patient denies f/c, blurry vision, CP, SOB, n/v/d/c, abd pain, change in BMs or urination, weakness or numbness in her legs.     Lives in the [place], former smoker, quit 50 years ago, denies alcohol or illicit drug use. PMD is Aidan Howard at [facility]. Her 50-year-old son passed away 3 months ago after suffering a suspected PE during the post-op period for a surgery on a leg injury sustained playing basketball.     In the ED:  Initial vital signs: T: 97.7 F, HR: 87, BP: 159/91, R: 17, SpO2: 99% on RA  Labs: significant for Hgb 11.3 (baseline 13), CMP wnl  Imaging:  CTH: No acute hemorrhage or calvarial fracture  CXR: No acute infiltrates  US Dopplers: No RLE DVT  EKG: NSR  Medications: Lorazepam 0.5, NS 1L  Consults: Orthopedics

## 2021-07-25 NOTE — H&P ADULT - NSHPLABSRESULTS_GEN_ALL_CORE
.  LABS:                         11.3   8.34  )-----------( 214      ( 2021 13:30 )             35.4         135  |  98  |  22  ----------------------------<  105<H>  4.7   |  28  |  1.06    Ca    9.6      2021 13:30    TPro  6.7  /  Alb  3.3  /  TBili  0.8  /  DBili  x   /  AST  27  /  ALT  14  /  AlkPhos  92  0725    PT/INR - ( 2021 13:30 )   PT: 12.6 sec;   INR: 1.05          PTT - ( 2021 13:30 )  PTT:22.0 sec  Urinalysis Basic - ( 2021 15:58 )    Color: Yellow / Appearance: Clear / S.010 / pH: x  Gluc: x / Ketone: Trace mg/dL  / Bili: Negative / Urobili: 0.2 E.U./dL   Blood: x / Protein: NEGATIVE mg/dL / Nitrite: NEGATIVE   Leuk Esterase: NEGATIVE / RBC: x / WBC x   Sq Epi: x / Non Sq Epi: x / Bacteria: x            Lactate, Blood: 1.9 mmol/L ( @ 13:30)      RADIOLOGY, EKG & ADDITIONAL TESTS: Reviewed.

## 2021-07-25 NOTE — H&P ADULT - PROBLEM SELECTOR PLAN 3
History of osteoarthritis in both knees and back. S/p right TKR.  - Pain management as above  - PT consult ADDENDUM: in setting of R knee pain, plan as above

## 2021-07-25 NOTE — ED PROVIDER NOTE - PROGRESS NOTE DETAILS
after much discussion ortho has declined pt admission after much discussion ortho has declined pt admission  VM left for attending orthopedic surgeon who has declined to return calls

## 2021-07-25 NOTE — ED ADULT NURSE NOTE - OBJECTIVE STATEMENT
Pt presents to ED C/O multiple falls, last fall this AM, pt hit head, denies LOC. No injuries noted upon assessment, pt denies LOC. Pt on baby aspirin daily states " I'm nervous about blood clots because my Son passed away from that so I take them to be cautious" Pt had recent R knee replacement Wed, pt surgical wound dry, healing with some pain and swelling. Pt made comfortable, daughter at bedside, RN continuing to monitor.

## 2021-07-25 NOTE — H&P ADULT - NSICDXPASTMEDICALHX_GEN_ALL_CORE_FT
PAST MEDICAL HISTORY:  Arthritis both knees    GERD (gastroesophageal reflux disease)     HLD (hyperlipidemia)     HTN (hypertension)

## 2021-07-25 NOTE — ED PROVIDER NOTE - OBJECTIVE STATEMENT
82 y/o F w/ hx of r knee replacement on July 21 post op day 4 presents to the ED w/ daughter in law complaining of increasing edema to r leg and 3 falls since discharge. Pt states that last night she fell and hit head and has been on Aspirin 325 mg for DVT. Pt states that she experiences pain when she bears weight causing her to be unable to put weight on her leg. Pt currently lives alone without much at home, as she has no other family around ever since son passed away 3 months post op period due to presumed blood clot. Pt did have physical therapy come to home and they noted that the pt had difficult getting up. Pt's daughter in law is concerned for pt as she is unable to care for self and is asking about possible rehab vs increased home services. Denies any fever, chills, cough, nausea, diarrhea, vomiting, and urinary symptoms.

## 2021-07-25 NOTE — H&P ADULT - ASSESSMENT
81F w/ PMHx HTN, HLD, and osteoarthritis s/p right knee replacement on July 21 POD5, presents to the ED complaining of 3 falls since the surgery due to right knee pain when attempting to ambulate.

## 2021-07-25 NOTE — H&P ADULT - PROBLEM SELECTOR PLAN 7
F: None   E: Replete for K<4 Mag<2  N: Regular Diet  A: Lovenox  Code status: full  Dispo: regular medical floor Home med: simvastatin 5  - C/w home simvastatin

## 2021-07-25 NOTE — H&P ADULT - NSHPPHYSICALEXAM_GEN_ALL_CORE
.  VITAL SIGNS:  T(C): 37 (07-25-21 @ 19:55), Max: 37 (07-25-21 @ 19:55)  T(F): 98.6 (07-25-21 @ 19:55), Max: 98.6 (07-25-21 @ 19:55)  HR: 84 (07-25-21 @ 19:55) (84 - 87)  BP: 106/68 (07-25-21 @ 19:55) (106/68 - 159/91)  BP(mean): --  RR: 16 (07-25-21 @ 19:55) (16 - 17)  SpO2: 98% (07-25-21 @ 19:55) (98% - 99%)  Wt(kg): --    PHYSICAL EXAM:    Constitutional: WDWN resting comfortably in bed; NAD  Head: NC/AT  Eyes: PERRL, EOMI, anicteric sclera  ENT: no nasal discharge; uvula midline, no oropharyngeal erythema or exudates; MMM  Neck: supple; no JVD or thyromegaly  Respiratory: CTA B/L; no W/R/R, no retractions  Cardiac: +S1/S2; RRR; no M/R/G; PMI non-displaced  Gastrointestinal: abdomen soft, NT/ND; no rebound or guarding; +BSx4  Back: spine midline, no bony tenderness or step-offs; no CVAT B/L  Extremities: WWP, no clubbing or cyanosis; no peripheral edema  Musculoskeletal: NROM x4; no joint swelling, tenderness or erythema  Vascular: 2+ radial, femoral, DP/PT pulses B/L  Dermatologic: skin warm, dry and intact; no rashes, wounds, or scars  Lymphatic: no submandibular or cervical LAD  Neurologic: AAOx3; CNII-XII grossly intact; no focal deficits  Psychiatric: affect and characteristics of appearance, verbalizations, behaviors are appropriate .  VITAL SIGNS:  T(C): 37 (07-25-21 @ 19:55), Max: 37 (07-25-21 @ 19:55)  T(F): 98.6 (07-25-21 @ 19:55), Max: 98.6 (07-25-21 @ 19:55)  HR: 84 (07-25-21 @ 19:55) (84 - 87)  BP: 106/68 (07-25-21 @ 19:55) (106/68 - 159/91)  BP(mean): --  RR: 16 (07-25-21 @ 19:55) (16 - 17)  SpO2: 98% (07-25-21 @ 19:55) (98% - 99%)  Wt(kg): --    PHYSICAL EXAM:    Constitutional: NAD  Head: NC/AT  Eyes: PERRL, EOMI, anicteric sclera  ENT: no nasal discharge; uvula midline, no oropharyngeal erythema or exudates; MMM  Neck: supple; no JVD or thyromegaly  Respiratory: CTA B/L; no W/R/R, no retractions  Cardiac: +S1/S2; RRR; no M/R/G; PMI non-displaced  Gastrointestinal: abdomen soft, NT/ND; no rebound or guarding; +BSx4  Back: spine midline, no bony tenderness or step-offs; no CVAT B/L  Extremities: WWP; +Right knee swollen, warm, and tender to palpation; Right ankle and shin 2+ pitting edema up to the knee  Musculoskeletal: NROM x4; no joint swelling, tenderness or erythema  Vascular: 2+ radial, femoral, DP/PT pulses B/L  Dermatologic: skin warm, dry and intact; no rashes, wounds, or scars  Lymphatic: no submandibular or cervical LAD  Neurologic: AAOx3; CNII-XII grossly intact; no focal deficits;   Psychiatric: affect and characteristics of appearance, verbalizations, behaviors are appropriate

## 2021-07-25 NOTE — ED PROVIDER NOTE - PHYSICAL EXAMINATION
CONSTITUTIONAL: Well-appearing; well-nourished; in no apparent distress.   HEAD: Normocephalic; atraumatic.   EYES:  conjunctiva and sclera clear  ENT: normal nose; no rhinorrhea; normal pharynx with no erythema or lesions.   NECK: Supple; non-tender;   CARDIOVASCULAR: Normal S1, S2; no murmurs, rubs, or gallops. Regular rate and rhythm.   RESPIRATORY: Breathing easily; breath sounds clear and equal bilaterally; no wheezes, rhonchi, or rales.  GI: Soft; non-distended; non-tender; no palpable organomegaly.   EXT: Linear incision over R knee w/ mild edema and slight erythema to incision, able to range knee, no purulence at incision; N/V intact  SKIN: Normal for age and race; warm; dry; good turgor; no apparent lesions or rash.   NEURO: A & O x 3; face symmetric; grossly unremarkable.   PSYCHOLOGICAL: The patient’s mood and manner are appropriate.

## 2021-07-25 NOTE — H&P ADULT - PROBLEM SELECTOR PLAN 6
F: None   E: Replete for K<4 Mag<2  N: Regular Diet  A: Lovenox  Code status: full  Dispo: regular medical floor GFR 50. Renally dose meds.  - F/u outpatient Home med: amlodipine 5.  - C/w home amlodipine

## 2021-07-25 NOTE — ED ADULT TRIAGE NOTE - CHIEF COMPLAINT QUOTE
Patient complains of weakness and multiple falls x 3 days. Reports right knee replacement surgery 4 days ago. Denies fevers, chills, nausea, vomiting, chest pain, SOB, or dizziness. Reports hit head, on Aspirin. Hx HLD, HTN.

## 2021-07-25 NOTE — H&P ADULT - ATTENDING COMMENTS
reviewed pertinent data , h&p  patient seen and examined overnight     PE as above w/ following exceptions/ additions: pt in NAD; R knee surgical site is clean, dry, intact w/ no erythema or discharge, it is swollen from post op changes; there is mild erythema interior to the surgical site below the knee w/ warmth      1. frequent falls s/p knee surgery , pending PT re-evaluation; c/w pain management, closely monitor for signs/ sxs of infection         rest of  plan as above

## 2021-07-25 NOTE — H&P ADULT - PROBLEM SELECTOR PLAN 1
Patient complaining of three falls since her surgery on 7/21. The first fall was in the hospital prior to discharge. The other 2 occurred at home. Falls were all due to pain in the right knee and inability to bear weight when she would try to ambulate with a walker. She was discharged to home with home PT, who have come to see her and noted her difficulty on standing. Patient with associated redness and swelling in the knee as well.  - LE doppler US negative for right leg DVT  - Ortho consulted  - PT consult  - Pain management: Oxycodone PO severe pain, Tylenol PO mild-moderate pain Patient complaining of three falls since her surgery on 7/21. The first fall was in the hospital prior to discharge. The other 2 occurred at home. Falls were all due to pain in the right knee and inability to bear weight when she would try to ambulate with a walker. She was discharged to home with home PT, who have come to see her and noted her difficulty on standing. Patient with associated redness and swelling in the knee as well.  - LE doppler US negative for right leg DVT  - Ortho consulted  - PT consult  - Pain management: Oxycodone 10 PO severe pain, Oxycodone 5 moderate pain, Tylenol PO mild pain

## 2021-07-25 NOTE — H&P ADULT - PROBLEM SELECTOR PLAN 4
Home med: amlodipine 5.  - C/w home amlodipine History of osteoarthritis in both knees and back. S/p right TKR.  - Pain management as above  - PT consult

## 2021-07-25 NOTE — ED PROVIDER NOTE - CLINICAL SUMMARY MEDICAL DECISION MAKING FREE TEXT BOX
82 y/o F here w/ 3 falls since discharge after surgery. Will check x-ray to rule out infection, ultrasound to rule out DVT. Ortho and PT consulted. 82 y/o F here w/ 3 falls since discharge after surgery. Will check x-ray to rule out infection, ultrasound to rule out DVT. Ortho and PT consulted.   Prior to surgery, pt was living independently, now cannot care for self

## 2021-07-25 NOTE — H&P ADULT - NSHPSOCIALHISTORY_GEN_ALL_CORE
Lives in the [place], former smoker, quit 50 years ago, denies alcohol or illicit drug use. PMD is Aidan Howard at [facility]

## 2021-07-26 ENCOUNTER — TRANSCRIPTION ENCOUNTER (OUTPATIENT)
Age: 81
End: 2021-07-26

## 2021-07-26 DIAGNOSIS — R53.1 WEAKNESS: ICD-10-CM

## 2021-07-26 DIAGNOSIS — R29.6 REPEATED FALLS: ICD-10-CM

## 2021-07-26 LAB
ANION GAP SERPL CALC-SCNC: 7 MMOL/L — SIGNIFICANT CHANGE UP (ref 5–17)
BUN SERPL-MCNC: 18 MG/DL — SIGNIFICANT CHANGE UP (ref 7–23)
CALCIUM SERPL-MCNC: 9.1 MG/DL — SIGNIFICANT CHANGE UP (ref 8.4–10.5)
CHLORIDE SERPL-SCNC: 104 MMOL/L — SIGNIFICANT CHANGE UP (ref 96–108)
CO2 SERPL-SCNC: 29 MMOL/L — SIGNIFICANT CHANGE UP (ref 22–31)
COVID-19 SPIKE DOMAIN AB INTERP: POSITIVE
COVID-19 SPIKE DOMAIN ANTIBODY RESULT: >250 U/ML — HIGH
CREAT SERPL-MCNC: 0.89 MG/DL — SIGNIFICANT CHANGE UP (ref 0.5–1.3)
CULTURE RESULTS: SIGNIFICANT CHANGE UP
GLUCOSE SERPL-MCNC: 91 MG/DL — SIGNIFICANT CHANGE UP (ref 70–99)
HCT VFR BLD CALC: 37.1 % — SIGNIFICANT CHANGE UP (ref 34.5–45)
HGB BLD-MCNC: 11 G/DL — LOW (ref 11.5–15.5)
MAGNESIUM SERPL-MCNC: 2.3 MG/DL — SIGNIFICANT CHANGE UP (ref 1.6–2.6)
MCHC RBC-ENTMCNC: 27.3 PG — SIGNIFICANT CHANGE UP (ref 27–34)
MCHC RBC-ENTMCNC: 29.6 GM/DL — LOW (ref 32–36)
MCV RBC AUTO: 92.1 FL — SIGNIFICANT CHANGE UP (ref 80–100)
NRBC # BLD: 0 /100 WBCS — SIGNIFICANT CHANGE UP (ref 0–0)
PHOSPHATE SERPL-MCNC: 3.1 MG/DL — SIGNIFICANT CHANGE UP (ref 2.5–4.5)
PLATELET # BLD AUTO: 194 K/UL — SIGNIFICANT CHANGE UP (ref 150–400)
POTASSIUM SERPL-MCNC: 4.3 MMOL/L — SIGNIFICANT CHANGE UP (ref 3.5–5.3)
POTASSIUM SERPL-SCNC: 4.3 MMOL/L — SIGNIFICANT CHANGE UP (ref 3.5–5.3)
RBC # BLD: 4.03 M/UL — SIGNIFICANT CHANGE UP (ref 3.8–5.2)
RBC # FLD: 13.5 % — SIGNIFICANT CHANGE UP (ref 10.3–14.5)
SARS-COV-2 IGG+IGM SERPL QL IA: >250 U/ML — HIGH
SARS-COV-2 IGG+IGM SERPL QL IA: POSITIVE
SODIUM SERPL-SCNC: 140 MMOL/L — SIGNIFICANT CHANGE UP (ref 135–145)
SPECIMEN SOURCE: SIGNIFICANT CHANGE UP
WBC # BLD: 6.25 K/UL — SIGNIFICANT CHANGE UP (ref 3.8–10.5)
WBC # FLD AUTO: 6.25 K/UL — SIGNIFICANT CHANGE UP (ref 3.8–10.5)

## 2021-07-26 PROCEDURE — 99233 SBSQ HOSP IP/OBS HIGH 50: CPT | Mod: GC

## 2021-07-26 RX ORDER — ENOXAPARIN SODIUM 100 MG/ML
40 INJECTION SUBCUTANEOUS EVERY 24 HOURS
Refills: 0 | Status: DISCONTINUED | OUTPATIENT
Start: 2021-07-26 | End: 2021-07-30

## 2021-07-26 RX ORDER — AMLODIPINE BESYLATE 2.5 MG/1
10 TABLET ORAL ONCE
Refills: 0 | Status: COMPLETED | OUTPATIENT
Start: 2021-07-27 | End: 2021-07-27

## 2021-07-26 RX ORDER — ATORVASTATIN CALCIUM 80 MG/1
10 TABLET, FILM COATED ORAL AT BEDTIME
Refills: 0 | Status: DISCONTINUED | OUTPATIENT
Start: 2021-07-26 | End: 2021-07-30

## 2021-07-26 RX ORDER — AMLODIPINE BESYLATE 2.5 MG/1
1 TABLET ORAL
Qty: 0 | Refills: 0 | DISCHARGE

## 2021-07-26 RX ADMIN — OXYCODONE HYDROCHLORIDE 10 MILLIGRAM(S): 5 TABLET ORAL at 14:57

## 2021-07-26 RX ADMIN — AMLODIPINE BESYLATE 5 MILLIGRAM(S): 2.5 TABLET ORAL at 06:49

## 2021-07-26 RX ADMIN — OXYCODONE HYDROCHLORIDE 10 MILLIGRAM(S): 5 TABLET ORAL at 16:00

## 2021-07-26 RX ADMIN — ATORVASTATIN CALCIUM 10 MILLIGRAM(S): 80 TABLET, FILM COATED ORAL at 22:16

## 2021-07-26 RX ADMIN — OXYCODONE HYDROCHLORIDE 5 MILLIGRAM(S): 5 TABLET ORAL at 11:35

## 2021-07-26 RX ADMIN — ENOXAPARIN SODIUM 40 MILLIGRAM(S): 100 INJECTION SUBCUTANEOUS at 06:49

## 2021-07-26 RX ADMIN — OXYCODONE HYDROCHLORIDE 5 MILLIGRAM(S): 5 TABLET ORAL at 12:40

## 2021-07-26 NOTE — DISCHARGE NOTE PROVIDER - HOSPITAL COURSE
#Discharge: do not delete    Patient is 80 yo F with past medical history of htn, hld, OA, POD 5 right knee replacement   Presented with 3 falls post right knee replacement likely 2/2 weakness after surgery. Patient received home PT that was difficult. Came to hospital, CT head was negative and knee X ray normal findings for replacement. Dopplers of right LE negative.   Problem List/Main Diagnoses (system-based):   #Frequent falls  Last fall 7/24. Home PT difficult for patient. possible candidate for acute rehab/LIMA   - C/w pain management: Oxycodone 10 PO severe pain, Oxycodone 5 moderate pain, Tylenol PO mild pain.   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  Associated redness and swelling.   - LE doppler US negative for right leg DVT  - PT on board  - Lovenox DVT ppx.   #Chronic kidney disease (CKD), stage III (moderate).    GFR 50  - F/u outpatient  #HTN (hypertension)  - patient has been hypertensive, put on amlodipine 10 from home dose amlodipine 5  #HLD (hyperlipidemia)  - C/w home simvastatin 5.      Patient is 82 yo F with past medical history of htn, hld, OA, POD 8 right knee replacement   Presented with 3 falls post right knee replacement likely 2/2 weakness after surgery. Patient received home PT that was difficult. Came to hospital, CT head was negative and knee X ray normal findings for replacement. Dopplers of right LE negative. Patient remained stable in the hospital and was discharged to [facility].    Problem List/Main Diagnoses (system-based):   #Frequent falls  Last fall 7/24. Home PT difficult for patient. possible candidate for acute rehab/LIMA   - C/w pain management: Oxycodone 10 PO severe pain, Oxycodone 5 moderate pain, Tylenol PO mild pain.   -Oxycodone 10PO 15-20 minutes prior to PT  #Right knee pain  S/p right knee replacement 2/2 arthritis.  Associated redness and swelling.   - LE doppler US negative for right leg DVT  - PT on board  - Lovenox DVT ppx.   #Chronic kidney disease (CKD), stage III (moderate).    GFR 50  - F/u outpatient  #HTN (hypertension)  - patient has been hypertensive, put on amlodipine 10 from home dose amlodipine 5  #HLD (hyperlipidemia)  - C/w home simvastatin 5.      Patient is 80 yo F with past medical history of htn, hld, OA, POD 9 right knee replacement   Presented with 3 falls post right knee replacement likely 2/2 weakness after surgery. Patient received home PT that was difficult. Came to hospital, CT head was negative and knee X ray normal findings for replacement. Dopplers of right LE negative. Patient remained stable in the hospital and was discharged to [facility].    Problem List/Main Diagnoses (system-based):   #Frequent falls  Last fall 7/24. Home PT difficult for patient. possible candidate for acute rehab/LIMA   - C/w pain management: Oxycodone 10 PO severe pain, Oxycodone 5 moderate pain, Tylenol PO mild pain.   -Oxycodone 10PO 15-20 minutes prior to PT  #Right knee pain  S/p right knee replacement 2/2 arthritis.  Associated warmth and swelling.   - LE doppler US negative for right leg DVT  - PT worked with patient and recommended LIMA        -patient with significant pain during PT, recommend ocycodone 10mg PO before sessions  - Lovenox DVT ppx.   #Chronic kidney disease (CKD), stage III (moderate).    GFR 50  - F/u outpatient  #HTN (hypertension)  - patient has been hypertensive, put on amlodipine 10 from home dose amlodipine 5  -continue amlodipine 10mg  #HLD (hyperlipidemia)  - C/w home simvastatin 5.      Patient is 80 yo F with past medical history of htn, hld, OA, POD 9 right knee replacement   Presented with 3 falls post right knee replacement likely 2/2 weakness after surgery. Patient received home PT that was difficult. Came to hospital, CT head was negative and knee X ray normal findings for replacement. Dopplers of right LE negative. Patient remained stable in the hospital and was discharged to [facility].    Problem List/Main Diagnoses (system-based):   #Frequent falls  Last fall 7/24. Home PT difficult for patient. possible candidate for acute rehab/LIMA   - C/w pain management: Oxycodone 10 PO severe pain, Oxycodone 5 moderate pain, Tylenol PO mild pain.   -Oxycodone 10PO 15-20 minutes prior to PT  #Right knee pain  S/p right knee replacement 2/2 arthritis.  Associated warmth and swelling.   - LE doppler US negative for right leg DVT  - PT worked with patient and recommended LIMA        -patient with significant pain during PT, recommend ocycodone 10mg PO before sessions  - Lovenox DVT ppx.   #Chronic kidney disease (CKD), stage III (moderate).    GFR 50  - F/u outpatient  #HTN (hypertension)  - patient has been hypertensive, put on amlodipine 10 from home dose amlodipine 5  -continue amlodipine 10mg  #HLD (hyperlipidemia)  - C/w home simvastatin 5.   #Constipation  -constipation likely 2/2 oxycodone, treated wit senna and miralax  -continue senna and miralax as needed

## 2021-07-26 NOTE — DISCHARGE NOTE PROVIDER - PROVIDER TOKENS
PROVIDER:[TOKEN:[[id number]],SCHEDULEDAPPT:[08/23/2021],SCHEDULEDAPPTTIME:[09:15 AM],ESTABLISHEDPATIENT:[T]],PROVIDER:[TOKEN:[[id number]],SCHEDULEDAPPT:[08/09/2021],SCHEDULEDAPPTTIME:[10:30 AM],ESTABLISHEDPATIENT:[T]]

## 2021-07-26 NOTE — PROGRESS NOTE ADULT - SUBJECTIVE AND OBJECTIVE BOX
CC: Patient is a 81y old  Female who presents with a chief complaint of frequent falls (2021 20:27)      INTERVAL EVENTS: admitted to floors.     SUBJECTIVE / INTERVAL HPI: Patient seen and examined at bedside. Patient states she has not had a bowel movement since her surgery, however she normally goes every 3-4 days.     ROS: negative unless otherwise stated above.    VITAL SIGNS:  Vital Signs Last 24 Hrs  T(C): 36.4 (2021 06:44), Max: 37.1 (2021 21:30)  T(F): 97.5 (2021 06:44), Max: 98.7 (2021 21:30)  HR: 73 (2021 06:44) (73 - 87)  BP: 162/89 (2021 06:44) (106/68 - 162/89)  BP(mean): --  RR: 18 (2021 06:44) (16 - 18)  SpO2: 99% (2021 06:44) (96% - 99%)        PHYSICAL EXAM:    General: NAD  HEENT: MMM  Neck: supple  Cardiovascular: +S1/S2; RRR  Respiratory: CTA B/L; no W/R/R  Gastrointestinal: soft, NT/ND  Extremities: WWP; 1+ pitting edema around R knee tender to palpation inferior aspect of R knee  Vascular: 2+ radial, DP/PT pulses B/L  Neurological: AAOx3; no focal deficits    MEDICATIONS:  MEDICATIONS  (STANDING):  amLODIPine   Tablet 5 milliGRAM(s) Oral daily  enoxaparin Injectable 40 milliGRAM(s) SubCutaneous every 24 hours    MEDICATIONS  (PRN):  acetaminophen   Tablet .. 650 milliGRAM(s) Oral every 4 hours PRN Mild Pain (1 - 3)  oxyCODONE    IR 10 milliGRAM(s) Oral every 4 hours PRN Severe Pain (7 - 10)  oxyCODONE    IR 5 milliGRAM(s) Oral every 6 hours PRN Moderate Pain (4 - 6)      ALLERGIES:  Allergies    No Known Allergies    Intolerances        LABS:                        11.0   6.25  )-----------( 194      ( 2021 06:59 )             37.1     07-26    140  |  104  |  18  ----------------------------<  91  4.3   |  29  |  0.89    Ca    9.1      2021 06:59  Phos  3.1     -  Mg     2.3     -    TPro  6.7  /  Alb  3.3  /  TBili  0.8  /  DBili  x   /  AST  27  /  ALT  14  /  AlkPhos  92  07-25    PT/INR - ( 2021 13:30 )   PT: 12.6 sec;   INR: 1.05          PTT - ( 2021 13:30 )  PTT:22.0 sec  Urinalysis Basic - ( 2021 15:58 )    Color: Yellow / Appearance: Clear / S.010 / pH: x  Gluc: x / Ketone: Trace mg/dL  / Bili: Negative / Urobili: 0.2 E.U./dL   Blood: x / Protein: NEGATIVE mg/dL / Nitrite: NEGATIVE   Leuk Esterase: NEGATIVE / RBC: x / WBC x   Sq Epi: x / Non Sq Epi: x / Bacteria: x      CAPILLARY BLOOD GLUCOSE          RADIOLOGY & ADDITIONAL TESTS: Reviewed. Hospital Course:  Patient is an 81 y F pmhx htn, hld who presented POD5 from right knee replacement due to 3 falls since surgery. Patient had falls (day of surgery), , .  In ED patients CT head negative, CXR negative, Dopplers for RLE showed no DVT. Hg 11.3. Patient admitted to floors, daughter requests acute rehab/LIMA for patient.     CC: Patient is a 81y old  Female who presents with a chief complaint of frequent falls (2021 20:27)    INTERVAL EVENTS: admitted to floors.     SUBJECTIVE / INTERVAL HPI: Patient seen and examined at bedside. Patient states she has not had a bowel movement since her surgery, however she normally goes every 3-4 days.     ROS: negative unless otherwise stated above.    VITAL SIGNS:  Vital Signs Last 24 Hrs  T(C): 36.4 (2021 06:44), Max: 37.1 (2021 21:30)  T(F): 97.5 (2021 06:44), Max: 98.7 (2021 21:30)  HR: 73 (2021 06:44) (73 - 87)  BP: 162/89 (2021 06:44) (106/68 - 162/89)  BP(mean): --  RR: 18 (2021 06:44) (16 - 18)  SpO2: 99% (2021 06:44) (96% - 99%)        PHYSICAL EXAM:    General: NAD  HEENT: MMM  Neck: supple  Cardiovascular: +S1/S2; RRR  Respiratory: CTA B/L; no W/R/R  Gastrointestinal: soft, NT/ND  Extremities: WWP; 1+ pitting edema around R knee tender to palpation inferior aspect of R knee  Vascular: 2+ radial, DP/PT pulses B/L  Neurological: AAOx3; no focal deficits    MEDICATIONS:  MEDICATIONS  (STANDING):  amLODIPine   Tablet 5 milliGRAM(s) Oral daily  enoxaparin Injectable 40 milliGRAM(s) SubCutaneous every 24 hours    MEDICATIONS  (PRN):  acetaminophen   Tablet .. 650 milliGRAM(s) Oral every 4 hours PRN Mild Pain (1 - 3)  oxyCODONE    IR 10 milliGRAM(s) Oral every 4 hours PRN Severe Pain (7 - 10)  oxyCODONE    IR 5 milliGRAM(s) Oral every 6 hours PRN Moderate Pain (4 - 6)      ALLERGIES:  Allergies    No Known Allergies    Intolerances        LABS:                        11.0   6.25  )-----------( 194      ( 2021 06:59 )             37.1     -    140  |  104  |  18  ----------------------------<  91  4.3   |  29  |  0.89    Ca    9.1      2021 06:59  Phos  3.1     -  Mg     2.3     -    TPro  6.7  /  Alb  3.3  /  TBili  0.8  /  DBili  x   /  AST  27  /  ALT  14  /  AlkPhos  92  07-25    PT/INR - ( 2021 13:30 )   PT: 12.6 sec;   INR: 1.05          PTT - ( 2021 13:30 )  PTT:22.0 sec  Urinalysis Basic - ( 2021 15:58 )    Color: Yellow / Appearance: Clear / S.010 / pH: x  Gluc: x / Ketone: Trace mg/dL  / Bili: Negative / Urobili: 0.2 E.U./dL   Blood: x / Protein: NEGATIVE mg/dL / Nitrite: NEGATIVE   Leuk Esterase: NEGATIVE / RBC: x / WBC x   Sq Epi: x / Non Sq Epi: x / Bacteria: x      CAPILLARY BLOOD GLUCOSE          RADIOLOGY & ADDITIONAL TESTS: Reviewed.

## 2021-07-26 NOTE — DISCHARGE NOTE PROVIDER - NSDCMRMEDTOKEN_GEN_ALL_CORE_FT
amLODIPine 5 mg oral tablet: 1 tab(s) orally once a day  Crestor 5 mg oral tablet: 1 tab(s) orally once a day   amLODIPine 5 mg oral tablet: 0.5 tab(s) orally once a day  aspirin 81 mg oral tablet: 1 tab(s) orally once a day  Crestor 5 mg oral tablet: 1 tab(s) orally once a day   acetaminophen 325 mg oral tablet: 2 tab(s) orally every 4 hours, As needed, Mild Pain (1 - 3)  amLODIPine 5 mg oral tablet: 1 tab(s) orally once a day  Crestor 5 mg oral tablet: 1 tab(s) orally once a day  oxyCODONE 10 mg oral tablet: 1 tab(s) orally every 4 hours, As needed, Severe Pain (7 - 10)  oxyCODONE 10 mg oral tablet: 1 tab(s) orally once a day  oxyCODONE 5 mg oral tablet: 1 tab(s) orally every 6 hours, As needed, Moderate Pain (4 - 6)  polyethylene glycol 3350 oral powder for reconstitution: 17 gram(s) orally once a day  senna oral tablet: 2 tab(s) orally once a day (at bedtime)   acetaminophen 325 mg oral tablet: 2 tab(s) orally every 4 hours, As needed, Mild Pain (1 - 3)  amLODIPine 10 mg oral tablet: 1 tab(s) orally every 24 hours  Crestor 5 mg oral tablet: 1 tab(s) orally once a day  enoxaparin: 40 milligram(s) subcutaneous once a day  oxyCODONE 10 mg oral tablet: 1 tab(s) orally every 4 hours, As needed, Severe Pain (7 - 10)  oxyCODONE 10 mg oral tablet: 1 tab(s) orally once a day  oxyCODONE 5 mg oral tablet: 1 tab(s) orally every 6 hours, As needed, Moderate Pain (4 - 6)  polyethylene glycol 3350 oral powder for reconstitution: 17 gram(s) orally once a day  senna oral tablet: 2 tab(s) orally once a day (at bedtime)

## 2021-07-26 NOTE — DISCHARGE NOTE PROVIDER - CARE PROVIDER_API CALL
Cali Mccain)  Orthopaedic Surgery  [address] [facility], 10th [place] [address]  Phone: [phone]  Fax: [phone]  Established Patient  Scheduled Appointment: 08/23/2021 09:15 AM    Aidan Howard  INFECTIOUS DISEASE  [facility], [address] [place] [id number]  Phone: [phone]  Fax: [phone]  Established Patient  Scheduled Appointment: 08/09/2021 10:30 AM

## 2021-07-26 NOTE — PHYSICAL THERAPY INITIAL EVALUATION ADULT - PERTINENT HX OF CURRENT PROBLEM, REHAB EVAL
81F  presents to the ED complaining of 3 falls since the surgery due to pain in her right knee when she attempts to ambulate. Prior to surgery, she was able to ambulate with a walker. Her first fall was in the hospital the day after surgery, when she tried to get up to go to the bathroom using her walker but fell to the floor due to inability to bear weight on right knee.

## 2021-07-26 NOTE — DISCHARGE NOTE PROVIDER - NSDCFUSCHEDAPPT_GEN_ALL_CORE_FT
ÓSCAR CHASE ; 08/10/2021 ; [facility]ro [address]  ÓSCAR CHASE ; 08/23/2021 ; [facility] OrthoSurg [facility]

## 2021-07-26 NOTE — DISCHARGE NOTE PROVIDER - NSDCCPCAREPLAN_GEN_ALL_CORE_FT
PRINCIPAL DISCHARGE DIAGNOSIS  Diagnosis: Fall at home, initial encounter  Assessment and Plan of Treatment: You had a few episodes of falls after your right knee replacement. We believe it was due to weakness in your knee after your procedure, and you need more physical therapy to help you rehabilitate from your procedure.      SECONDARY DISCHARGE DIAGNOSES  Diagnosis: Right knee pain  Assessment and Plan of Treatment: You have had some right knee pain post op from your procedure. This usually happens after a major procedure and we believe with properly physical therapy and rehabilitation the pain can go down. You were started on a pain regimen in the hospital.    Diagnosis: HLD (hyperlipidemia)  Assessment and Plan of Treatment: You have high cholesterol. Cholesterol is a substance that is found in the blood. Everyone has some. It is needed for good health. The problem is, people sometimes have too much cholesterol. Compared with people with normal cholesterol, people with high cholesterol have a higher risk of heart attacks, strokes, and other health problems. The higher your cholesterol, the higher your risk of these problems. It is important to take your medications for high cholesterol as prescribed to prevent the complications of this condition.  PLAN  continue with your       Diagnosis: HTN (hypertension)  Assessment and Plan of Treatment: Hypertension is the medical term for high blood pressure. Blood pressure refers to the pressure that blood applies to the inner walls of the arteries. Arteries carry blood from the heart to other organs and parts of the body. Untreated high blood pressure increases the strain on the heart and arteries, eventually causing organ damage. High blood pressure increases the risk of heart failure, heart attack (myocardial infarction), stroke, and kidney failure. High blood pressure does not usually cause any symptoms. Treatment of hypertension usually begins with lifestyle changes. Making these lifestyle changes involves little or no risk. Recommended changes often include reducing the amount of salt in your diet, losing weight if you are overweight or obese, avoiding drinking too much alcohol, stopping smoking and exercising at least 30 minutes per day most days of the week. If you are prescribed medication for your hypertension it is important to take these as prescribed to prevent the possible complications of uncontrolled hypertension.  PLAN  continue your home dose of amlodipine 5 mg and follow up with your PCP.        PRINCIPAL DISCHARGE DIAGNOSIS  Diagnosis: Fall at home, initial encounter  Assessment and Plan of Treatment: You had a few episodes of falls after your right knee replacement. We believe it was due to weakness in your knee after your procedure, and you need more physical therapy to help you rehabilitate from your procedure.      SECONDARY DISCHARGE DIAGNOSES  Diagnosis: Right knee pain  Assessment and Plan of Treatment: You have had persistent right knee pain post op from your procedure. This usually happens after a major procedure and we believe with properly physical therapy and rehabilitation the pain can go down. You were started on a pain regimen in the hospital, tylenol for mild pain, oxycodone 5mg for moderate pain and 10mg oxycodone for severe pain. We recommend taking 10mg oxycodone about 20 minutes prior to PT so you can tolerate movement, as you progress through PT try to use less and less pain medication.  You have an appointment with your orthopedic surgeon Dr. Mccain on 8/23 at 915am.    Diagnosis: HTN (hypertension)  Assessment and Plan of Treatment: Hypertension is the medical term for high blood pressure. Treatment of hypertension usually begins with lifestyle changes. Making these lifestyle changes involves little or no risk. Recommended changes often include reducing the amount of salt in your diet, losing weight if you are overweight or obese, avoiding drinking too much alcohol, stopping smoking and exercising at least 30 minutes per day most days of the week. If you are prescribed medication for your hypertension it is important to take these as prescribed to prevent the possible complications of uncontrolled hypertension.  PLAN  You were taking 5mg amlodipine at home, we increased your dose to 10mg here and your blood pressures become more stable. Please continue taking 10mg amlodipine daily for your high blood pressure      Diagnosis: HLD (hyperlipidemia)  Assessment and Plan of Treatment: You have high cholesterol. Cholesterol is a substance that is found in the blood. Everyone has some. It is needed for good health. The problem is, people sometimes have too much cholesterol. Compared with people with normal cholesterol, people with high cholesterol have a higher risk of heart attacks, strokes, and other health problems. The higher your cholesterol, the higher your risk of these problems. It is important to take your medications for high cholesterol as prescribed to prevent the complications of this condition.  PLAN  continue with your        PRINCIPAL DISCHARGE DIAGNOSIS  Diagnosis: Fall at home, initial encounter  Assessment and Plan of Treatment: You had a few episodes of falls after your right knee replacement. We believe it was due to weakness in your knee after your procedure, and you need more physical therapy to help you rehabilitate from your procedure.      SECONDARY DISCHARGE DIAGNOSES  Diagnosis: Right knee pain  Assessment and Plan of Treatment: You have had persistent right knee pain post op from your procedure. This usually happens after a major procedure and we believe with properly physical therapy and rehabilitation the pain can go down. You were started on a pain regimen in the hospital, tylenol for mild pain, oxycodone 5mg for moderate pain and 10mg oxycodone for severe pain. We recommend taking 10mg oxycodone about 20 minutes prior to PT so you can tolerate movement, as you progress through PT try to use less and less pain medication.  You have an appointment with your orthopedic surgeon Dr. Mccain on 8/23 at 915am.    Diagnosis: HTN (hypertension)  Assessment and Plan of Treatment: Hypertension is the medical term for high blood pressure. Treatment of hypertension usually begins with lifestyle changes. Making these lifestyle changes involves little or no risk. Recommended changes often include reducing the amount of salt in your diet, losing weight if you are overweight or obese, avoiding drinking too much alcohol, stopping smoking and exercising at least 30 minutes per day most days of the week. If you are prescribed medication for your hypertension it is important to take these as prescribed to prevent the possible complications of uncontrolled hypertension.  PLAN  You were taking 5mg amlodipine at home, we increased your dose to 10mg here and your blood pressures become more stable. Please continue taking 10mg amlodipine daily for your high blood pressure      Diagnosis: HLD (hyperlipidemia)  Assessment and Plan of Treatment: You have high cholesterol. Cholesterol is a substance that is found in the blood. Everyone has some. It is needed for good health. The problem is, people sometimes have too much cholesterol. Compared with people with normal cholesterol, people with high cholesterol have a higher risk of heart attacks, strokes, and other health problems. The higher your cholesterol, the higher your risk of these problems. It is important to take your medications for high cholesterol as prescribed to prevent the complications of this condition.  PLAN  continue with your       Diagnosis: Constipation  Assessment and Plan of Treatment: You were constipated often throughout your hospital stay and we gave you senna and miralax which helped a little. This is likely because of the pain medications you are on. Please continue to take both these medication as needed

## 2021-07-27 DIAGNOSIS — K59.00 CONSTIPATION, UNSPECIFIED: ICD-10-CM

## 2021-07-27 LAB
ANION GAP SERPL CALC-SCNC: 6 MMOL/L — SIGNIFICANT CHANGE UP (ref 5–17)
BUN SERPL-MCNC: 18 MG/DL — SIGNIFICANT CHANGE UP (ref 7–23)
CALCIUM SERPL-MCNC: 9.9 MG/DL — SIGNIFICANT CHANGE UP (ref 8.4–10.5)
CHLORIDE SERPL-SCNC: 100 MMOL/L — SIGNIFICANT CHANGE UP (ref 96–108)
CO2 SERPL-SCNC: 33 MMOL/L — HIGH (ref 22–31)
CREAT SERPL-MCNC: 0.87 MG/DL — SIGNIFICANT CHANGE UP (ref 0.5–1.3)
GLUCOSE SERPL-MCNC: 98 MG/DL — SIGNIFICANT CHANGE UP (ref 70–99)
HCT VFR BLD CALC: 38.4 % — SIGNIFICANT CHANGE UP (ref 34.5–45)
HGB BLD-MCNC: 12.1 G/DL — SIGNIFICANT CHANGE UP (ref 11.5–15.5)
MCHC RBC-ENTMCNC: 28.9 PG — SIGNIFICANT CHANGE UP (ref 27–34)
MCHC RBC-ENTMCNC: 31.5 GM/DL — LOW (ref 32–36)
MCV RBC AUTO: 91.9 FL — SIGNIFICANT CHANGE UP (ref 80–100)
NRBC # BLD: 0 /100 WBCS — SIGNIFICANT CHANGE UP (ref 0–0)
PLATELET # BLD AUTO: 248 K/UL — SIGNIFICANT CHANGE UP (ref 150–400)
POTASSIUM SERPL-MCNC: 4.4 MMOL/L — SIGNIFICANT CHANGE UP (ref 3.5–5.3)
POTASSIUM SERPL-SCNC: 4.4 MMOL/L — SIGNIFICANT CHANGE UP (ref 3.5–5.3)
RBC # BLD: 4.18 M/UL — SIGNIFICANT CHANGE UP (ref 3.8–5.2)
RBC # FLD: 13.4 % — SIGNIFICANT CHANGE UP (ref 10.3–14.5)
SODIUM SERPL-SCNC: 139 MMOL/L — SIGNIFICANT CHANGE UP (ref 135–145)
WBC # BLD: 6.29 K/UL — SIGNIFICANT CHANGE UP (ref 3.8–10.5)
WBC # FLD AUTO: 6.29 K/UL — SIGNIFICANT CHANGE UP (ref 3.8–10.5)

## 2021-07-27 PROCEDURE — 99233 SBSQ HOSP IP/OBS HIGH 50: CPT | Mod: GC

## 2021-07-27 RX ORDER — AMLODIPINE BESYLATE 2.5 MG/1
10 TABLET ORAL EVERY 24 HOURS
Refills: 0 | Status: DISCONTINUED | OUTPATIENT
Start: 2021-07-28 | End: 2021-07-30

## 2021-07-27 RX ORDER — POLYETHYLENE GLYCOL 3350 17 G/17G
17 POWDER, FOR SOLUTION ORAL ONCE
Refills: 0 | Status: COMPLETED | OUTPATIENT
Start: 2021-07-27 | End: 2021-07-27

## 2021-07-27 RX ORDER — AMLODIPINE BESYLATE 2.5 MG/1
0.5 TABLET ORAL
Qty: 0 | Refills: 0 | DISCHARGE

## 2021-07-27 RX ADMIN — OXYCODONE HYDROCHLORIDE 10 MILLIGRAM(S): 5 TABLET ORAL at 08:00

## 2021-07-27 RX ADMIN — OXYCODONE HYDROCHLORIDE 10 MILLIGRAM(S): 5 TABLET ORAL at 18:50

## 2021-07-27 RX ADMIN — OXYCODONE HYDROCHLORIDE 10 MILLIGRAM(S): 5 TABLET ORAL at 17:46

## 2021-07-27 RX ADMIN — AMLODIPINE BESYLATE 10 MILLIGRAM(S): 2.5 TABLET ORAL at 05:58

## 2021-07-27 RX ADMIN — OXYCODONE HYDROCHLORIDE 10 MILLIGRAM(S): 5 TABLET ORAL at 07:18

## 2021-07-27 RX ADMIN — ENOXAPARIN SODIUM 40 MILLIGRAM(S): 100 INJECTION SUBCUTANEOUS at 05:58

## 2021-07-27 RX ADMIN — ATORVASTATIN CALCIUM 10 MILLIGRAM(S): 80 TABLET, FILM COATED ORAL at 22:29

## 2021-07-27 NOTE — PROGRESS NOTE ADULT - PROBLEM SELECTOR PLAN 4
Patient has not had BM since surgery, POD6. Says she normally has BM every 3-4 days  Started on miralax, and an enema ordered

## 2021-07-27 NOTE — PROGRESS NOTE ADULT - SUBJECTIVE AND OBJECTIVE BOX
CC: Patient is a 81y old  Female who presents with a chief complaint of frequent falls (2021 07:38)  Hospital Course:  Patient is an 81 y F pmhx htn, hld who presented POD5 from right knee replacement due to 3 falls since surgery. Patient had falls (day of surgery), , .  In ED patients CT head negative, CXR negative, Dopplers for RLE showed no DVT. Hg 11.3. Patient admitted to floors. Started on bowel regimen for constipation. Daughter requests acute rehab/LIMA for patient.     INTERVAL EVENTS: JAEL    SUBJECTIVE / INTERVAL HPI: Patient seen and examined at bedside. She has not used the bathroom yet    ROS: negative unless otherwise stated above.    VITAL SIGNS:  Vital Signs Last 24 Hrs  T(C): 36.7 (2021 06:07), Max: 36.7 (2021 12:25)  T(F): 98.1 (2021 06:07), Max: 98.1 (2021 06:07)  HR: 74 (2021 06:07) (74 - 93)  BP: 147/83 (2021 06:07) (124/80 - 147/83)  BP(mean): --  RR: 18 (2021 06:07) (18 - 18)  SpO2: 96% (2021 06:07) (93% - 99%)        PHYSICAL EXAM:    General: NAD  HEENT: MMM  Neck: supple  Cardiovascular: +S1/S2; RRR  Respiratory: CTA B/L; no W/R/R  Gastrointestinal: soft, NT/ND  Extremities: WWP; 1+ pitting edema around R knee tender to palpation inferior aspect of R knee  Vascular: 2+ radial, DP/PT pulses B/L  Neurological: AAOx3; no focal deficits    MEDICATIONS:  MEDICATIONS  (STANDING):  atorvastatin 10 milliGRAM(s) Oral at bedtime  enoxaparin Injectable 40 milliGRAM(s) SubCutaneous every 24 hours    MEDICATIONS  (PRN):  acetaminophen   Tablet .. 650 milliGRAM(s) Oral every 4 hours PRN Mild Pain (1 - 3)  oxyCODONE    IR 10 milliGRAM(s) Oral every 4 hours PRN Severe Pain (7 - 10)  oxyCODONE    IR 5 milliGRAM(s) Oral every 6 hours PRN Moderate Pain (4 - 6)      ALLERGIES:  Allergies    No Known Allergies    Intolerances        LABS:                        12.1   6.29  )-----------( 248      ( 2021 08:01 )             38.4     07    139  |  100  |  18  ----------------------------<  98  4.4   |  33<H>  |  0.87    Ca    9.9      2021 08:01  Phos  3.1     07-  Mg     2.3         TPro  6.7  /  Alb  3.3  /  TBili  0.8  /  DBili  x   /  AST  27  /  ALT  14  /  AlkPhos  92  07-    PT/INR - ( 2021 13:30 )   PT: 12.6 sec;   INR: 1.05          PTT - ( 2021 13:30 )  PTT:22.0 sec  Urinalysis Basic - ( 2021 15:58 )    Color: Yellow / Appearance: Clear / S.010 / pH: x  Gluc: x / Ketone: Trace mg/dL  / Bili: Negative / Urobili: 0.2 E.U./dL   Blood: x / Protein: NEGATIVE mg/dL / Nitrite: NEGATIVE   Leuk Esterase: NEGATIVE / RBC: x / WBC x   Sq Epi: x / Non Sq Epi: x / Bacteria: x      CAPILLARY BLOOD GLUCOSE          RADIOLOGY & ADDITIONAL TESTS: Reviewed.

## 2021-07-28 PROCEDURE — 99233 SBSQ HOSP IP/OBS HIGH 50: CPT | Mod: GC

## 2021-07-28 RX ADMIN — OXYCODONE HYDROCHLORIDE 5 MILLIGRAM(S): 5 TABLET ORAL at 23:03

## 2021-07-28 RX ADMIN — OXYCODONE HYDROCHLORIDE 10 MILLIGRAM(S): 5 TABLET ORAL at 16:19

## 2021-07-28 RX ADMIN — OXYCODONE HYDROCHLORIDE 5 MILLIGRAM(S): 5 TABLET ORAL at 09:58

## 2021-07-28 RX ADMIN — AMLODIPINE BESYLATE 10 MILLIGRAM(S): 2.5 TABLET ORAL at 06:21

## 2021-07-28 RX ADMIN — OXYCODONE HYDROCHLORIDE 10 MILLIGRAM(S): 5 TABLET ORAL at 01:17

## 2021-07-28 RX ADMIN — OXYCODONE HYDROCHLORIDE 10 MILLIGRAM(S): 5 TABLET ORAL at 15:11

## 2021-07-28 RX ADMIN — OXYCODONE HYDROCHLORIDE 10 MILLIGRAM(S): 5 TABLET ORAL at 00:47

## 2021-07-28 RX ADMIN — ATORVASTATIN CALCIUM 10 MILLIGRAM(S): 80 TABLET, FILM COATED ORAL at 23:03

## 2021-07-28 RX ADMIN — OXYCODONE HYDROCHLORIDE 5 MILLIGRAM(S): 5 TABLET ORAL at 06:27

## 2021-07-28 RX ADMIN — ENOXAPARIN SODIUM 40 MILLIGRAM(S): 100 INJECTION SUBCUTANEOUS at 06:21

## 2021-07-28 NOTE — PROGRESS NOTE ADULT - PROBLEM SELECTOR PLAN 4
Patient has not had BM since surgery, POD6. Says she normally has BM every 3-4 days  Started on miralax, and an enema ordered Patient was constipated, had enema yesterday and subsequent large BM

## 2021-07-28 NOTE — DIETITIAN INITIAL EVALUATION ADULT. - PROBLEM SELECTOR PLAN 4
History of osteoarthritis in both knees and back. S/p right TKR.  - Pain management as above  - PT consult

## 2021-07-28 NOTE — PROGRESS NOTE ADULT - SUBJECTIVE AND OBJECTIVE BOX
Patient was seen and examined by me at bedside. I agree with resident's note, subjective, objective physical exam, assessment and plan with following modifications/additions.    Greater than 35 minutes spent on total encounter; more than 50% of the visit was spent counseling and/or coordinating care by the attending physician.    Pending LIMA otherwise clinically stable, had BM after bowel regimen yest    Wilian Saha MD [phone]

## 2021-07-28 NOTE — PROGRESS NOTE ADULT - SUBJECTIVE AND OBJECTIVE BOX
INTERVAL HPI/OVERNIGHT EVENTS:  Patient was seen and examined at bedside. As per nurse and patient, no o/n events, patient resting comfortably. No complaints at this time. ROS negative    VITAL SIGNS:  T(F): 98.4 (07-28-21 @ 12:51)  HR: 83 (07-28-21 @ 05:55)  BP: 108/71 (07-28-21 @ 12:51)  RR: 18 (07-28-21 @ 12:51)  SpO2: 97% (07-28-21 @ 12:51)  Wt(kg): --    PHYSICAL EXAM:    General: NAD  HEENT: MMM  Neck: supple  Cardiovascular: +S1/S2; RRR  Respiratory: CTA B/L; no W/R/R  Gastrointestinal: soft, NT/ND  Extremities: WWP; 1+ pitting edema around R knee tender to palpation inferior aspect of R knee  Vascular: 2+ radial, DP/PT pulses B/L  Neurological: AAOx3; no focal deficits    MEDICATIONS  (STANDING):  amLODIPine   Tablet 10 milliGRAM(s) Oral every 24 hours  atorvastatin 10 milliGRAM(s) Oral at bedtime  enoxaparin Injectable 40 milliGRAM(s) SubCutaneous every 24 hours    MEDICATIONS  (PRN):  acetaminophen   Tablet .. 650 milliGRAM(s) Oral every 4 hours PRN Mild Pain (1 - 3)  oxyCODONE    IR 10 milliGRAM(s) Oral every 4 hours PRN Severe Pain (7 - 10)  oxyCODONE    IR 5 milliGRAM(s) Oral every 6 hours PRN Moderate Pain (4 - 6)      Allergies    No Known Allergies    Intolerances        LABS:                        12.1   6.29  )-----------( 248      ( 27 Jul 2021 08:01 )             38.4     07-27    139  |  100  |  18  ----------------------------<  98  4.4   |  33<H>  |  0.87    Ca    9.9      27 Jul 2021 08:01            RADIOLOGY & ADDITIONAL TESTS:  Reviewed

## 2021-07-28 NOTE — DIETITIAN INITIAL EVALUATION ADULT. - OTHER INFO
80y/o female with history of HTN, HLD ,CKD stage 3,Arthritis admitted s/p fall S/P POD#5 knee replacement. No N/V/D/C or skin breakdown(with surgical incision). BM 7/27.Patient eating 80% of meals .Patient is 118% of IBW. Weight in June admission:68.4kg, Present weight:67.4kg,IBW:56.8kg.Pending Auth for placement.

## 2021-07-28 NOTE — DIETITIAN INITIAL EVALUATION ADULT. - PROBLEM SELECTOR PLAN 1
Patient complaining of three falls since her surgery on 7/21. The first fall was in the hospital prior to discharge. The other 2 occurred at home. Falls were all due to pain in the right knee and inability to bear weight when she would try to ambulate with a walker. She was discharged to home with home PT, who have come to see her and noted her difficulty on standing. Patient with associated redness and swelling in the knee as well.  - LE doppler US negative for right leg DVT  - Ortho consulted  - PT consult  - Pain management: Oxycodone 10 PO severe pain, Oxycodone 5 moderate pain, Tylenol PO mild pain

## 2021-07-28 NOTE — DIETITIAN INITIAL EVALUATION ADULT. - PROBLEM SELECTOR PLAN 2
S/p right knee replacement. Reported cause for patient's falls post-op. Associated redness and swelling. No fever, leukocytosis. hemodynamically stable.  - LE doppler US negative for right leg DVT  - PT consult  - Pain management as above  - Lovenox DVT ppx  - Resume Aspirin 325mg DVT ppx on discharge

## 2021-07-29 DIAGNOSIS — Z71.89 OTHER SPECIFIED COUNSELING: ICD-10-CM

## 2021-07-29 DIAGNOSIS — Z51.5 ENCOUNTER FOR PALLIATIVE CARE: ICD-10-CM

## 2021-07-29 DIAGNOSIS — R53.81 OTHER MALAISE: ICD-10-CM

## 2021-07-29 PROCEDURE — 99223 1ST HOSP IP/OBS HIGH 75: CPT

## 2021-07-29 PROCEDURE — 99497 ADVNCD CARE PLAN 30 MIN: CPT | Mod: 25

## 2021-07-29 PROCEDURE — 99233 SBSQ HOSP IP/OBS HIGH 50: CPT | Mod: GC

## 2021-07-29 PROCEDURE — 99358 PROLONG SERVICE W/O CONTACT: CPT

## 2021-07-29 RX ORDER — OXYCODONE HYDROCHLORIDE 5 MG/1
1 TABLET ORAL
Qty: 0 | Refills: 0 | DISCHARGE
Start: 2021-07-29

## 2021-07-29 RX ORDER — SENNA PLUS 8.6 MG/1
2 TABLET ORAL
Qty: 0 | Refills: 0 | DISCHARGE
Start: 2021-07-29

## 2021-07-29 RX ORDER — POLYETHYLENE GLYCOL 3350 17 G/17G
17 POWDER, FOR SOLUTION ORAL DAILY
Refills: 0 | Status: DISCONTINUED | OUTPATIENT
Start: 2021-07-29 | End: 2021-07-30

## 2021-07-29 RX ORDER — ACETAMINOPHEN 500 MG
2 TABLET ORAL
Qty: 0 | Refills: 0 | DISCHARGE
Start: 2021-07-29

## 2021-07-29 RX ORDER — ASPIRIN/CALCIUM CARB/MAGNESIUM 324 MG
1 TABLET ORAL
Qty: 0 | Refills: 0 | DISCHARGE

## 2021-07-29 RX ORDER — OXYCODONE HYDROCHLORIDE 5 MG/1
10 TABLET ORAL DAILY
Refills: 0 | Status: DISCONTINUED | OUTPATIENT
Start: 2021-07-29 | End: 2021-07-30

## 2021-07-29 RX ORDER — POLYETHYLENE GLYCOL 3350 17 G/17G
17 POWDER, FOR SOLUTION ORAL
Qty: 0 | Refills: 0 | DISCHARGE
Start: 2021-07-29

## 2021-07-29 RX ORDER — SENNA PLUS 8.6 MG/1
2 TABLET ORAL AT BEDTIME
Refills: 0 | Status: DISCONTINUED | OUTPATIENT
Start: 2021-07-29 | End: 2021-07-30

## 2021-07-29 RX ADMIN — OXYCODONE HYDROCHLORIDE 10 MILLIGRAM(S): 5 TABLET ORAL at 15:30

## 2021-07-29 RX ADMIN — OXYCODONE HYDROCHLORIDE 5 MILLIGRAM(S): 5 TABLET ORAL at 17:39

## 2021-07-29 RX ADMIN — POLYETHYLENE GLYCOL 3350 17 GRAM(S): 17 POWDER, FOR SOLUTION ORAL at 15:25

## 2021-07-29 RX ADMIN — ENOXAPARIN SODIUM 40 MILLIGRAM(S): 100 INJECTION SUBCUTANEOUS at 06:25

## 2021-07-29 RX ADMIN — OXYCODONE HYDROCHLORIDE 5 MILLIGRAM(S): 5 TABLET ORAL at 00:05

## 2021-07-29 RX ADMIN — ATORVASTATIN CALCIUM 10 MILLIGRAM(S): 80 TABLET, FILM COATED ORAL at 22:23

## 2021-07-29 RX ADMIN — OXYCODONE HYDROCHLORIDE 10 MILLIGRAM(S): 5 TABLET ORAL at 15:23

## 2021-07-29 RX ADMIN — AMLODIPINE BESYLATE 10 MILLIGRAM(S): 2.5 TABLET ORAL at 06:25

## 2021-07-29 RX ADMIN — OXYCODONE HYDROCHLORIDE 5 MILLIGRAM(S): 5 TABLET ORAL at 08:30

## 2021-07-29 RX ADMIN — SENNA PLUS 2 TABLET(S): 8.6 TABLET ORAL at 22:23

## 2021-07-29 RX ADMIN — OXYCODONE HYDROCHLORIDE 5 MILLIGRAM(S): 5 TABLET ORAL at 07:30

## 2021-07-29 NOTE — PROGRESS NOTE ADULT - PROBLEM SELECTOR PLAN 2
S/p right knee replacement 2/2 arthritis.  Associated redness and swelling.   - PT on board  - Pain management as above  - Lovenox DVT ppx
S/p right knee replacement 2/2 arthritis.  Associated redness and swelling.   - PT on board  - Pain management as above  - Lovenox DVT ppx  - Evaluated by orthopedics today, no change to management
S/p right knee replacement 2/2 arthritis.  Associated redness and swelling.   - LE doppler US negative for right leg DVT  - PT on board  - Pain management as above  - Lovenox DVT ppx
S/p right knee replacement 2/2 arthritis.  Associated redness and swelling.   - PT on board  - Pain management as above  - Lovenox DVT ppx

## 2021-07-29 NOTE — PROGRESS NOTE ADULT - PROBLEM SELECTOR PROBLEM 6
Nutrition, metabolism, and development symptoms
HLD (hyperlipidemia)

## 2021-07-29 NOTE — CONSULT NOTE ADULT - PROBLEM SELECTOR RECOMMENDATION 5
.  Complex medical decision making / symptom management in the setting of advanced illness.    Palliative Care will SIGN OFF.  Emotional support provided, questions answered.    For new or uncontrolled symptoms, please call Palliative Care at [phone]. The service is available 24/7 (including nights & weekends) to provide symptom management recommendations over the phone as appropriate

## 2021-07-29 NOTE — PROGRESS NOTE ADULT - PROBLEM SELECTOR PLAN 1
Last fall 7/24. Home PT difficult for patient. possible candidate for acute rehab/[facility]   - C/w pain management: Oxycodone 10 PO severe pain, Oxycodone 5 moderate pain, Tylenol PO mild pain
Last fall 7/24. Home PT difficult for patient. possible candidate for acute rehab/[facility]   - C/w pain management: Oxycodone 10 PO severe pain, Oxycodone 5 moderate pain, Tylenol PO mild pain
Last fall 7/24. Home PT difficult for patient. possible candidate for acute rehab/[facility]   - C/w pain management: Oxycodone 10 PO severe pain, Oxycodone 5 moderate pain, Tylenol PO mild pain  - As per palliative recs, started oxycodone 10 mg PO PRN for prior to PT to encourage work with PT
Last fall 7/24. Home PT difficult for patient. possible candidate for acute rehab/[facility]   - C/w pain management: Oxycodone 10 PO severe pain, Oxycodone 5 moderate pain, Tylenol PO mild pain

## 2021-07-29 NOTE — PROGRESS NOTE ADULT - PROBLEM SELECTOR PLAN 3
GFR 50. Renally dose meds.  - F/u outpatient

## 2021-07-29 NOTE — GOALS OF CARE CONVERSATION - ADVANCED CARE PLANNING - CONVERSATION DETAILS
Had a conversationg with patient to establish goals of care. Pt says that she has previously had discussion with her PCP where she filled out forms to make her daughter-in-law her health care proxy. Pt also reports that she has had a discussion of risks and benefits of resuscitation with her PCP and that she wishes to have whatever is necessary to continue life. Patient wishes to be full code.

## 2021-07-29 NOTE — PROGRESS NOTE ADULT - PROBLEM SELECTOR PLAN 6
Home med: simvastatin 5  - C/w home simvastatin
F: None   E: Replete for K<4 Mag<2  N: Regular Diet  A: Lovenox  Code status: full  Dispo: regular medical floor
Home med: simvastatin 5  - C/w home simvastatin at home dose
Home med: simvastatin 5  - C/w home simvastatin

## 2021-07-29 NOTE — CONSULT NOTE ADULT - CONSULT REASON
GOC discussion in the setting of recent falls GOC discussion in the setting of recent falls/advanced illness

## 2021-07-29 NOTE — PROGRESS NOTE ADULT - PROBLEM SELECTOR PLAN 7
F: None   E: Replete for K<4 Mag<2  N: Regular Diet  A: Lovenox  Code status: full  Dispo: pending [facility] placement
F: None   E: Replete for K<4 Mag<2  N: Regular Diet  A: Lovenox  Code status: full  Dispo: regular medical floor
F: None   E: Replete for K<4 Mag<2  N: Regular Diet  A: Lovenox  Code status: full  Dispo: pending [facility] placement, plan for discharge in AM

## 2021-07-29 NOTE — PROGRESS NOTE ADULT - ASSESSMENT
81F w/ PMHx HTN, HLD, POD 5 R knee replacement complicated by 3 falls after surgery and difficulty ambulating, now pending placement at [facility] tomorrow. 
81F w/ PMHx HTN, HLD, POD 5 R knee replacement complicated by 3 falls after surgery and difficulty ambulating.

## 2021-07-29 NOTE — PROGRESS NOTE ADULT - PROBLEM SELECTOR PROBLEM 3
Chronic kidney disease (CKD), stage III (moderate)

## 2021-07-29 NOTE — CONSULT NOTE ADULT - PROBLEM SELECTOR RECOMMENDATION 4
Palliative consulted for GOC. Pt has had previous discussion with PCP and filled out MOLST and HCP forms  - pt wishes to remain full code  - HCP daughter-in-law: Crow Szymanski

## 2021-07-29 NOTE — CONSULT NOTE ADULT - ATTENDING COMMENTS
82yo F meeting Advanced Illness criteria. Symptom recommendations noted above to optimize patient's functional status and ability to rehabilitate. [facility] documented, patient has had thorough exploration of advanced directives with her PCP. She wishes to be Full Code and has completed a HCP designating her daughter-in-law as an alternate agent.    Goals are established; Symptoms are managed. Palliative Care will SIGN OFF.  Please reconsult with any new issues or concerns (including nights/weekends): Call [phone]    Patient seen and evaluated with Dr. Sauer, Internal Medicine Resident on Palliative Care rotation, during bedside rounds. Agree with above findings and recommendations, edited documentation as appropriate to reflect the plan of care discussed with patient and myself. 82yo F meeting Advanced Illness criteria. Symptom recommendations noted above to optimize patient's functional status and ability to rehabilitate. [facility] documented, patient has had thorough exploration of advanced directives with her PCP. She wishes to be Full Code and has completed a HCP designating her daughter-in-law as an alternate agent.    In addition to the E/M visit, an advance care planning meeting was performed.  Start time: 11:00AM; End time: 11:16AM; Total time: 16min  A face to face meeting to discuss advance care planning was held today regarding: ÓSCAR CHASE  Primary decision maker: Patient is able to participate in decision making; Alternate/surrogate: Crow Nicholas, [phone]  Discussed advance directives including, but not limited to, healthcare proxy and code status.  Decision regarding code status: FULL CODE; Documentation completed today: [facility] note    Goals are established; Symptoms are managed. Palliative Care will SIGN OFF.  Please reconsult with any new issues or concerns (including nights/weekends): Call [phone]    Patient seen and evaluated with Dr. Sauer, Internal Medicine Resident on Palliative Care rotation, during bedside rounds. Agree with above findings and recommendations, edited documentation as appropriate to reflect the plan of care discussed with patient and myself.

## 2021-07-29 NOTE — PROGRESS NOTE ADULT - SUBJECTIVE AND OBJECTIVE BOX
Patient was seen and examined by me at bedside. I agree with resident's note, subjective, objective physical exam, assessment and plan with following modifications/additions.    Greater than 35 minutes spent on total encounter; more than 50% of the visit was spent counseling and/or coordinating care by the attending physician.    81F w/ PMHx HTN, HLD, POD 6 R knee replacement complicated by 3 falls after surgery and difficulty ambulating, seen by ortho and films reviewed on further surgery planned, plan for [facility]   -Will confirm with ortho this am (who will see site today) to confirm no further imaging/workup before [facility] dc. c/w pain regimen and bowel regimen  -C/w HTN and HL meds   -DVT px- continue lovenox on dc to [facility] for DVT px  -Dispo- pending [facility] gerardo Saha MD [phone]

## 2021-07-29 NOTE — CONSULT NOTE ADULT - ASSESSMENT
81F w/ PMHx HTN, HLD, s/p R knee replacement complicated by 3 falls after surgery and difficulty ambulating. Palliative care consulted for GOC discussion. 81F w/ PMHx HTN, HLD, s/p R knee replacement complicated by 3 falls after surgery and difficulty ambulating. Palliative consulted fr complex medical decision making in the setting of advanced illness      ·	recommend a separate PRN for pre-medication prior to activity/PT: Oxy IR 10mg PO q6h PRN 15-30min prior to Activity 81F w/ PMHx HTN, HLD, s/p R knee replacement complicated by 3 falls after surgery and difficulty ambulating. Palliative consulted for complex medical decision making in the setting of advanced illness      ·	recommend a separate PRN for pre-medication prior to activity/PT: Oxy IR 10mg PO q6h PRN 15-30min prior to Activity 81F w/ PMHx HTN, HLD, s/p R knee replacement complicated by 3 falls after surgery and difficulty ambulating. Palliative consulted for complex medical decision making in the setting of advanced illness      ·	recommend a separate PRN for pre-medication prior to activity/PT: Oxy IR 10mg PO q6h PRN 15-30min prior to Activity  ·	see GOC note: patient electing to remain Full Code and has completed HCP

## 2021-07-29 NOTE — PROGRESS NOTE ADULT - SUBJECTIVE AND OBJECTIVE BOX
Orthopaedic Surgery Progress Note    Post-operative day #8 s/p R TKR     Subjective:     Patient seen and examined. Patient comfortable without complaints, pain controlled. Denies chest pain, shortness of breath, nausea/vomiting, numbness/tingling. States the knee pain is better today than yesterday.    Objective:    Vital Signs Last 24 Hrs  T(C): 36.6 (07-29-21 @ 06:11), Max: 36.6 (07-29-21 @ 06:11)  T(F): 97.8 (07-29-21 @ 06:11), Max: 97.8 (07-29-21 @ 06:11)  HR: 77 (07-29-21 @ 06:11) (77 - 77)  BP: 121/74 (07-29-21 @ 06:11) (121/74 - 121/74)  RR: 18 (07-29-21 @ 06:11) (18 - 18)  SpO2: 96% (07-29-21 @ 06:11) (96% - 96%)  AVSS    PE:  General: Patient alert and oriented, NAD  Dressing: Clean/dry/intact aquacel R knee   Pulses: 2+ DP BLE   Sensation: SILT BLE   Motor: EHL/FHL/TA/GS 5/5 BLE   Calves nontender bilaterally    A/P: 80yo Female POD#8 s/p R TKR re-admitted due to frequent falls at home   1. Pain control as needed  2. DVT prophylaxis: per primary team - will need DVT prophylaxis x 30 days post-op, currently on Lovenox   3. PT, weight-bearing status: WBAT   4. Dispo: [facility]    Ortho Pager [phone]

## 2021-07-29 NOTE — CONSULT NOTE ADULT - PROBLEM SELECTOR RECOMMENDATION 9
S/p right knee replacement 2/2 arthritis  -current pain regimen Tylenol PO mild pain, Oxycodone 5 moderate pain, Oxycodone 10 PO severe pain  -pain is currently well-controlled at rest, but complains of 10/10 pain with ambulation  Recommendations:  - continue Tylenol PO mild pain, Oxycodone 5 moderate pain, Oxycodone 10 PO severe pain  - add separate PRN for pre-medication prior to activity/PT: Oxy IR 10mg PO q6h PRN 15-30min prior to Activity  - add bowel regimen: senna and miralax S/p right knee replacement 2/2 arthritis  -current pain regimen Tylenol PO mild pain, Oxycodone 5 moderate pain, Oxycodone 10 PO severe pain  -pain is currently well-controlled at rest, but complains of 10/10 pain with ambulation  Recommendations:  - continue Tylenol PO mild pain, Oxycodone 5 moderate pain, Oxycodone 10 PO severe pain  - add separate PRN for pre-medication prior to activity/PT: Oxy IR 10mg PO q6h PRN 15-30min prior to Activity

## 2021-07-29 NOTE — CONSULT NOTE ADULT - PROBLEM SELECTOR RECOMMENDATION 3
PPSV = 50%, requires assistance for most ADLs  Recommendations:  - PT Eval  - c/w supportive care, OOB, encourage movement

## 2021-07-29 NOTE — PROGRESS NOTE ADULT - PROBLEM SELECTOR PLAN 5
Home med: amlodipine 5. Increased to 10 after HTN  - continue amlodipine 10
Home med: amlodipine 5. Increased to 10 after HTN  - c/w amlodipine 10 mg PO Qd
Home med: simvastatin 5  - C/w home simvastatin
Home med: amlodipine 5.  - patient has been hypertensive, put on amlodipine 10

## 2021-07-29 NOTE — CONSULT NOTE ADULT - PROBLEM SELECTOR RECOMMENDATION 2
Palliative consulted for GOC. Pt has had previous discussion with PCP and filled out MOLST and HCP forms  -pt wishes to remain full code  -HCP daughter-in-law: Crow Loredo Chronic issue, exacerbated by opiates  Recommendations:  - add bowel regimen: senna and miralax

## 2021-07-29 NOTE — CONSULT NOTE ADULT - SUBJECTIVE AND OBJECTIVE BOX
[facility] Geriatrics and Palliative Care  Contact Info: Call [phone] (including Nights/Weekend)    HPI:  81F w/ PMHx HTN, HLD, and osteoarthritis s/p right TKR on July 21 POD5, presents to the ED complaining of 3 falls since the surgery due to pain in her right knee when she attempts to ambulate. Prior to surgery, she was able to ambulate with a walker. Her first fall was in the hospital the day after surgery, when she tried to get up to go to the bathroom using her walker but fell to the floor due to inability to bear weight on right knee. She was discharged to home with home PT, who noted that the pt had difficulty standing up. She also fell at home on POD2, and she fell this morning at home and hit her head. She did not sustain any injuries or trauma from the falls. Denies any palpitations, lightheadedness, fainting, dizziness. Knee pain is controlled while she is lying down. Has taken tylenol for pain. She was prescribed tramadol and oxycodone for post-op pain but has not taken any. Patient currently lives at home alone. Fortunately her niece was nearby this weekend and was able to come to help her up after she fell down. She has been on Aspirin 325 mg for DVT ppx. Pt's daughter-in-law at the bedside is concerned that the patient is unable to care for herself and is interested in rehab or increased home services. No history of DVTs or PEs. Patient denies f/c, blurry vision, CP, SOB, n/v/d/c, abd pain, change in BMs or urination, weakness or numbness in her legs.     Lives in the [place], former smoker, quit 50 years ago, denies alcohol or illicit drug use. PMD is Aidan Howard at [facility]. Her 50-year-old son passed away 3 months ago after suffering a suspected PE during the post-op period for a surgery on a leg injury sustained playing basketball.     In the ED:  Initial vital signs: T: 97.7 F, HR: 87, BP: 159/91, R: 17, SpO2: 99% on RA  Labs: significant for Hgb 11.3 (baseline 13), CMP wnl  Imaging:  CTH: No acute hemorrhage or calvarial fracture  CXR: No acute infiltrates  US Dopplers: No RLE DVT  EKG: NSR  Medications: Lorazepam 0.5, NS 1L  Consults: Orthopedics (25 Jul 2021 20:27)    PERTINENT PM/SXH:   GERD (gastroesophageal reflux disease)    HTN (hypertension)    Arthritis    HLD (hyperlipidemia)      No significant past surgical history    H/O total knee replacement, right      FAMILY HISTORY:  No pertinent family history in first degree relatives      ITEMS NOT CHECKED ARE NOT PRESENT    SOCIAL HISTORY:   Significant other/partner:  [x]  Children:  [x]  Mormonism/Spirituality:  Substance hx:  []   Tobacco hx:  []   Alcohol hx: []   Home Opioid hx:  [] I-Stop Reference No:  - no active Rx's / see chart note  Living Situation: []Home  []Long term care  []Rehab []Other    ADVANCE DIRECTIVES:    [x]MOLST  []Living Will  DECISION MAKER(s):  [x] Health Care Proxy(s)  [] Surrogate(s)  [] Guardian           Name(s)/Phone Number(s): Daughter-in-law Crow Loredo [phone]    BASELINE (I)ADLs (prior to admission):  [place]: []Total  [] Moderate []Dependent    ALLERGIES:  No Known Allergies    MEDICATIONS  (STANDING):  amLODIPine   Tablet 10 milliGRAM(s) Oral every 24 hours  atorvastatin 10 milliGRAM(s) Oral at bedtime  enoxaparin Injectable 40 milliGRAM(s) SubCutaneous every 24 hours    MEDICATIONS  (PRN):  acetaminophen   Tablet .. 650 milliGRAM(s) Oral every 4 hours PRN Mild Pain (1 - 3)  oxyCODONE    IR 10 milliGRAM(s) Oral every 4 hours PRN Severe Pain (7 - 10)  oxyCODONE    IR 5 milliGRAM(s) Oral every 6 hours PRN Moderate Pain (4 - 6)    PRESENT SYMPTOMS: []Unable to obtain due to poor mentation/encephalopathy  Source if other than patient:  []Family   []Team     Pain: [ ] yes [x] no  QOL impact -   Location -                    Aggravating Factors -  Quality -  Radiation -  Timing -  Severity (0-10 scale) -   Minimal Acceptable Level (0-10 scale) -    PAIN AD Score:  http://[email or web address]/painad.pdf (press ctrl +  left click to view)    Dyspnea:                           []Mild  []Moderate []Severe  Anxiety:                             []Mild []Moderate []Severe  Fatigue:                             []Mild []Moderate []Severe  Nausea:                             []Mild []Moderate []Severe  Loss of Appetite:              []Mild []Moderate []Severe  Constipation:                    []Mild []Moderate [x]Severe    Other Symptoms:  []All other review of systems negative     Palliative Performance Status Version 2:  %    http://[email or web address]/[email or web address]_performance_scale_[email or web address].pdf    PHYSICAL EXAM:  GENERAL:  [x]Alert  [x]Oriented x   []Lethargic  []Cachexia  []Unarousable  [x]Verbal  []Non-Verbal  Behavioral:   [] Anxiety  [] Delirium [] Agitation [] Other  HEENT:  [x]Normal   []Dry mouth   []ET Tube/Trach  []Oral lesions  PULMONARY:   [x]Clear []Tachypnea  []Audible excessive secretions   []Rhonchi        []Right []Left []Bilateral  []Crackles        []Right []Left []Bilateral  []Wheezing     []Right []Left []Bilateral  CARDIOVASCULAR:    [x]Regular []Irregular []Tachy  []Berry []Murmur []Other  GASTROINTESTINAL:  [x]Soft  []Distended   []+BS  [x]Non tender []Tender  []PEG []OGT/ NGT  Last BM: 7/27/21  MUSCULOSKELETAL:   []Normal   []Weakness  []Bed/Wheelchair bound []Edema  NEUROLOGIC:   [x]No focal deficits  [] Cognitive impairment  [] Dysphagia []Dysarthria [] Paresis []Encephalopathic   SKIN:   []Normal   []Pressure ulcer(s)  []Rash    CRITICAL CARE:  [ ] Shock Present  [ ]Septic [ ]Cardiogenic [ ]Neurologic [ ]Hypovolemic  [ ]  Vasopressors [ ]  Inotropes   [ ] Respiratory failure present [ ] Mechanical Ventilation [ ] Non-invasive ventilatory support [ ] High-Flow  [ ] Acute  [ ] Chronic [ ] Hypoxic  [ ] Hypercarbic [ ] Other  [ ] Other organ failure    Vital Signs Last 24 Hrs  T(C): 36.6 (29 Jul 2021 06:11), Max: 37.1 (28 Jul 2021 20:45)  T(F): 97.8 (29 Jul 2021 06:11), Max: 98.7 (28 Jul 2021 20:45)  HR: 77 (29 Jul 2021 06:11) (77 - 90)  BP: 121/74 (29 Jul 2021 06:11) (108/71 - 121/74)  BP(mean): --  RR: 18 (29 Jul 2021 06:11) (18 - 18)  SpO2: 96% (29 Jul 2021 06:11) (96% - 98%) I&O's Summary      LABS:          RADIOLOGY & ADDITIONAL STUDIES:      PROTEIN CALORIE MALNUTRITION PRESENT: [ ]mild [ ]moderate [ ]severe [ ]underweight [ ]morbid obesity  []PPSV2 < or = to 30% []significant weight loss  []poor nutritional intake []catabolic state []anasarca     Artificial Nutrition []     REFERRALS:  [x]Social Work  []Case management []PT/OT []Chaplaincy  []Hospice  []Patient/Family Support    Care Coordination/Goals of Care Document:                                          Progress Notes    PROGRESS NOTE  Date & Time of Note   2021-07-28 14:50    Notes    Notes: Patient medically cleared for discharge. Pending Auth to [facility] remains  available.       Electronically signed by:  Ibis Reeder  Electronically signed on:  2021-07-28  14:53        [facility] Geriatrics and Palliative Care  Contact Info: Call [phone] (including Nights/Weekend)    HPI:  81F w/ PMHx HTN, HLD, and osteoarthritis s/p right TKR on July 21 POD5, presents to the ED complaining of 3 falls since the surgery due to pain in her right knee when she attempts to ambulate. Prior to surgery, she was able to ambulate with a walker. Her first fall was in the hospital the day after surgery, when she tried to get up to go to the bathroom using her walker but fell to the floor due to inability to bear weight on right knee. She was discharged to home with home PT, who noted that the pt had difficulty standing up. She also fell at home on POD2, and she fell this morning at home and hit her head. She did not sustain any injuries or trauma from the falls. Denies any palpitations, lightheadedness, fainting, dizziness. Knee pain is controlled while she is lying down. Has taken tylenol for pain. She was prescribed tramadol and oxycodone for post-op pain but has not taken any. Patient currently lives at home alone. Fortunately her niece was nearby this weekend and was able to come to help her up after she fell down. She has been on Aspirin 325 mg for DVT ppx. Pt's daughter-in-law at the bedside is concerned that the patient is unable to care for herself and is interested in rehab or increased home services. No history of DVTs or PEs. Patient denies f/c, blurry vision, CP, SOB, n/v/d/c, abd pain, change in BMs or urination, weakness or numbness in her legs.     Lives in the [place], former smoker, quit 50 years ago, denies alcohol or illicit drug use. PMD is Aidan Howard at [facility]. Her 50-year-old son passed away 3 months ago after suffering a suspected PE during the post-op period for a surgery on a leg injury sustained playing basketball. (25 Jul 2021 20:27)    Patient seen and examined at bedside. Denies any complaints at rest but has excruciating pain with ambulation. Patient has had advanced care planning conversations with her PCP, electing to be Full Code and designated her daughter in law as her HCP.    PERTINENT PM/SXH:   GERD (gastroesophageal reflux disease)  HTN (hypertension)  Arthritis  HLD (hyperlipidemia)  No significant past surgical history  H/O total knee replacement, right    FAMILY HISTORY:  No pertinent family history in first degree relatives    ITEMS NOT CHECKED ARE NOT PRESENT    SOCIAL HISTORY:   Significant other/partner:  [x]  Children:  [x]  Uatsdin/Spirituality:  Substance hx:  []   Tobacco hx:  []   Alcohol hx: []   Home Opioid hx:  [] I-Stop Reference No:  - no active Rx's / see chart note  Living Situation: [x]Home  []Long term care  []Rehab []Other    ADVANCE DIRECTIVES:    [x]MOLST  []Living Will  DECISION MAKER(s):  [x] Health Care Proxy(s)  [] Surrogate(s)  [] Guardian           Name(s)/Phone Number(s): Daughter-in-law Crow Loredo [phone]    BASELINE (I)ADLs (prior to admission):  [place]: []Total  [] Moderate []Dependent    ALLERGIES:  No Known Allergies    MEDICATIONS  (STANDING):  amLODIPine   Tablet 10 milliGRAM(s) Oral every 24 hours  atorvastatin 10 milliGRAM(s) Oral at bedtime  enoxaparin Injectable 40 milliGRAM(s) SubCutaneous every 24 hours    MEDICATIONS  (PRN):  acetaminophen   Tablet .. 650 milliGRAM(s) Oral every 4 hours PRN Mild Pain (1 - 3)  oxyCODONE    IR 10 milliGRAM(s) Oral every 4 hours PRN Severe Pain (7 - 10)  oxyCODONE    IR 5 milliGRAM(s) Oral every 6 hours PRN Moderate Pain (4 - 6)    PRESENT SYMPTOMS: []Unable to obtain due to poor mentation/encephalopathy  Source if other than patient:  []Family   []Team     Pain: [ ] yes [x] no  QOL impact -   Location -                    Aggravating Factors -  Quality -  Radiation -  Timing -  Severity (0-10 scale) -   Minimal Acceptable Level (0-10 scale) -    PAIN AD Score:  http://geriatrictoolkit.[employer].edu/cog/painad.pdf (press ctrl +  left click to view)    Dyspnea:                           []Mild  []Moderate []Severe  Anxiety:                             []Mild []Moderate []Severe  Fatigue:                             []Mild []Moderate []Severe  Nausea:                             []Mild []Moderate []Severe  Loss of Appetite:              []Mild []Moderate []Severe  Constipation:                    []Mild []Moderate [x]Severe    Other Symptoms:  []All other review of systems negative     Palliative Performance Status Version 2:  %    [email or web address]/files/news/palliative_performance_scale_[email or web address].pdf    PHYSICAL EXAM:  GENERAL:  [x]Alert  [x]Oriented x   []Lethargic  []Cachexia  []Unarousable  [x]Verbal  []Non-Verbal  Behavioral:   [] Anxiety  [] Delirium [] Agitation [] Other  HEENT:  [x]Normal   []Dry mouth   []ET Tube/Trach  []Oral lesions  PULMONARY:   [x]Clear []Tachypnea  []Audible excessive secretions   []Rhonchi        []Right []Left []Bilateral  []Crackles        []Right []Left []Bilateral  []Wheezing     []Right []Left []Bilateral  CARDIOVASCULAR:    [x]Regular []Irregular []Tachy  []Berry []Murmur []Other  GASTROINTESTINAL:  [x]Soft  []Distended   []+BS  [x]Non tender []Tender  []PEG []OGT/ NGT  Last BM: 7/27/21  MUSCULOSKELETAL:   []Normal   []Weakness  []Bed/Wheelchair bound []Edema  NEUROLOGIC:   [x]No focal deficits  [] Cognitive impairment  [] Dysphagia []Dysarthria [] Paresis []Encephalopathic   SKIN:   []Normal   []Pressure ulcer(s)  []Rash    CRITICAL CARE:  [ ] Shock Present  [ ]Septic [ ]Cardiogenic [ ]Neurologic [ ]Hypovolemic  [ ]  Vasopressors [ ]  Inotropes   [ ] Respiratory failure present [ ] Mechanical Ventilation [ ] Non-invasive ventilatory support [ ] High-Flow  [ ] Acute  [ ] Chronic [ ] Hypoxic  [ ] Hypercarbic [ ] Other  [ ] Other organ failure    Vital Signs Last 24 Hrs  T(C): 36.6 (29 Jul 2021 06:11), Max: 37.1 (28 Jul 2021 20:45)  T(F): 97.8 (29 Jul 2021 06:11), Max: 98.7 (28 Jul 2021 20:45)  HR: 77 (29 Jul 2021 06:11) (77 - 90)  BP: 121/74 (29 Jul 2021 06:11) (108/71 - 121/74)  BP(mean): --  RR: 18 (29 Jul 2021 06:11) (18 - 18)  SpO2: 96% (29 Jul 2021 06:11) (96% - 98%) I&O's Summary      LABS: None new    RADIOLOGY & ADDITIONAL STUDIES:  < from: Xray Knee 4 Views, Right (07.23.21 @ 13:59) >  Frontal, lateral and bilateral oblique views of the right knee demonstrates total right knee arthroplasty in appropriate alignment. Expected air and fluid within the joint.    PROTEIN CALORIE MALNUTRITION PRESENT: [ ]mild [ ]moderate [ ]severe [ ]underweight [ ]morbid obesity  []PPSV2 < or = to 30% []significant weight loss  []poor nutritional intake []catabolic state []anasarca     Artificial Nutrition []     REFERRALS:  [x]Social Work  []Case management []PT/OT []Chaplaincy  []Hospice  []Patient/Family Support    Care Coordination/Goals of Care Document:   PROGRESS NOTE  Date & Time of Note   2021-07-28 14:50    Notes    Notes: Patient medically cleared for discharge. Pending Auth to [facility]APIA remains  available.       Electronically signed by:  Ibis Reeder  Electronically signed on:  2021-07-28  14:53        [facility] Geriatrics and Palliative Care  Contact Info: Call [phone] (including Nights/Weekend)    HPI:  81F w/ PMHx HTN, HLD, and osteoarthritis s/p right TKR on July 21 POD5, presents to the ED complaining of 3 falls since the surgery due to pain in her right knee when she attempts to ambulate. Prior to surgery, she was able to ambulate with a walker. Her first fall was in the hospital the day after surgery, when she tried to get up to go to the bathroom using her walker but fell to the floor due to inability to bear weight on right knee. She was discharged to home with home PT, who noted that the pt had difficulty standing up. She also fell at home on POD2, and she fell this morning at home and hit her head. She did not sustain any injuries or trauma from the falls. Denies any palpitations, lightheadedness, fainting, dizziness. Knee pain is controlled while she is lying down. Has taken tylenol for pain. She was prescribed tramadol and oxycodone for post-op pain but has not taken any. Patient currently lives at home alone. Fortunately her niece was nearby this weekend and was able to come to help her up after she fell down. She has been on Aspirin 325 mg for DVT ppx. Pt's daughter-in-law at the bedside is concerned that the patient is unable to care for herself and is interested in rehab or increased home services. No history of DVTs or PEs. Patient denies f/c, blurry vision, CP, SOB, n/v/d/c, abd pain, change in BMs or urination, weakness or numbness in her legs.     Lives in the [place], former smoker, quit 50 years ago, denies alcohol or illicit drug use. PMD is Aidan Howard at [facility]. Her 50-year-old son passed away 3 months ago after suffering a suspected PE during the post-op period for a surgery on a leg injury sustained playing basketball. (25 Jul 2021 20:27)    Patient seen and examined at bedside. Denies any complaints at rest but has excruciating pain with ambulation. Patient has had advanced care planning conversations with her PCP, electing to be Full Code and designated her daughter in law as her HCP.    PERTINENT PM/SXH:   GERD (gastroesophageal reflux disease)  HTN (hypertension)  Arthritis  HLD (hyperlipidemia)  No significant past surgical history  H/O total knee replacement, right    FAMILY HISTORY:  No pertinent family history in first degree relatives    ITEMS NOT CHECKED ARE NOT PRESENT    SOCIAL HISTORY:   Significant other/partner:  [x]  Children:  [x]  Adventist/Spirituality:  Substance hx:  []   Tobacco hx:  []   Alcohol hx: []   Home Opioid hx:  [x] I-Stop Reference No: [id number]  - 07/23/2021	07/23/2021	oxycodone hcl 5 mg tablet	28	7	JesúsmandiemansiArik ambrosedy	[id number]	[facility]  Living Situation: [x]Home  []Long term care  []Rehab []Other    ADVANCE DIRECTIVES:    [x]MOLST  []Living Will  DECISION MAKER(s):  [x] Health Care Proxy(s)  [] Surrogate(s)  [] Guardian           Name(s)/Phone Number(s): Daughter-in-law Crow Loredo [phone]    BASELINE (I)ADLs (prior to admission):  [place]: []Total  [x] Moderate []Dependent    ALLERGIES:  No Known Allergies    MEDICATIONS  (STANDING):  amLODIPine   Tablet 10 milliGRAM(s) Oral every 24 hours  atorvastatin 10 milliGRAM(s) Oral at bedtime  enoxaparin Injectable 40 milliGRAM(s) SubCutaneous every 24 hours    MEDICATIONS  (PRN):  acetaminophen   Tablet .. 650 milliGRAM(s) Oral every 4 hours PRN Mild Pain (1 - 3)  oxyCODONE    IR 10 milliGRAM(s) Oral every 4 hours PRN Severe Pain (7 - 10)  oxyCODONE    IR 5 milliGRAM(s) Oral every 6 hours PRN Moderate Pain (4 - 6)    PRESENT SYMPTOMS: []Unable to obtain due to poor mentation/encephalopathy  Source if other than patient:  []Family   []Team     Pain: [x] yes [] no  QOL impact - debilitating  Location - leg                  Aggravating Factors - weight-bearing  Quality - sharp  Radiation -   Timing - intermittent  Severity (0-10 scale) - 10 at its worst  Minimal Acceptable Level (0-10 scale) -    PAIN AD Score:  [email or web address].[place].[facility]/cog/painad.pdf (press ctrl +  left click to view)    Dyspnea:                           []Mild  []Moderate []Severe  Anxiety:                             []Mild []Moderate []Severe  Fatigue:                             []Mild []Moderate []Severe  Nausea:                             []Mild []Moderate []Severe  Loss of Appetite:              []Mild []Moderate []Severe  Constipation:                    []Mild []Moderate [x]Severe    Other Symptoms:  [x]All other review of systems negative     Palliative Performance Status Version 2:  50%    [email or web address]/files/[email or web address]_performance_scale_[email or web address].pdf    PHYSICAL EXAM:  GENERAL:  [x]Alert  [x]Oriented x3   []Lethargic  []Cachexia  []Unarousable  [x]Verbal  []Non-Verbal  Behavioral:   [] Anxiety  [] Delirium [] Agitation [] Other  HEENT:  [x]Normal   []Dry mouth   []ET Tube/Trach  []Oral lesions  PULMONARY:   [x]Clear []Tachypnea  []Audible excessive secretions   []Rhonchi        []Right []Left []Bilateral  []Crackles        []Right []Left []Bilateral  []Wheezing     []Right []Left []Bilateral  CARDIOVASCULAR:    [x]Regular []Irregular []Tachy  []Berry []Murmur []Other  GASTROINTESTINAL:  [x]Soft  []Distended   []+BS  [x]Non tender []Tender  []PEG []OGT/ NGT  Last BM: 7/27/21  MUSCULOSKELETAL:   []Normal   []Weakness  []Bed/Wheelchair bound []Edema  NEUROLOGIC:   [x]No focal deficits  [] Cognitive impairment  [] Dysphagia []Dysarthria [] Paresis []Encephalopathic   SKIN:   [x]Normal   []Pressure ulcer(s)  []Rash    CRITICAL CARE:  [ ] Shock Present  [ ]Septic [ ]Cardiogenic [ ]Neurologic [ ]Hypovolemic  [ ]  Vasopressors [ ]  Inotropes   [ ] Respiratory failure present [ ] Mechanical Ventilation [ ] Non-invasive ventilatory support [ ] High-Flow  [ ] Acute  [ ] Chronic [ ] Hypoxic  [ ] Hypercarbic [ ] Other  [ ] Other organ failure    Vital Signs Last 24 Hrs  T(C): 36.6 (29 Jul 2021 06:11), Max: 37.1 (28 Jul 2021 20:45)  T(F): 97.8 (29 Jul 2021 06:11), Max: 98.7 (28 Jul 2021 20:45)  HR: 77 (29 Jul 2021 06:11) (77 - 90)  BP: 121/74 (29 Jul 2021 06:11) (108/71 - 121/74)  BP(mean): --  RR: 18 (29 Jul 2021 06:11) (18 - 18)  SpO2: 96% (29 Jul 2021 06:11) (96% - 98%) I&O's Summary      LABS: None new    RADIOLOGY & ADDITIONAL STUDIES:  < from: Xray Knee 4 Views, Right (07.23.21 @ 13:59) >  Frontal, lateral and bilateral oblique views of the right knee demonstrates total right knee arthroplasty in appropriate alignment. Expected air and fluid within the joint.    PROTEIN CALORIE MALNUTRITION PRESENT: [ ]mild [ ]moderate [ ]severe [ ]underweight [ ]morbid obesity  []PPSV2 < or = to 30% []significant weight loss  []poor nutritional intake []catabolic state []anasarca     Artificial Nutrition []     REFERRALS:  [x]Social Work  []Case management [x]PT/OT []Chaplaincy  []Hospice  []Patient/Family Support    Care Coordination/Goals of Care Document:   PROGRESS NOTE  Date & Time of Note   2021-07-28 14:50   Notes: Patient medically cleared for discharge. Pending Auth to [facility]. REGINA remains  available.   Electronically signed by:  Ibis Reeder  Electronically signed on:  2021-07-28  14:53      PALLIATIVE MEDICINE COORDINATION OF CARE DOCUMENTATION: [x] Inpatient Consult  Non-Face-to-Face prolonged service provided that relates to (face-to-face) care that has or will occur and ongoing patient management, including one or more of the following: - Reviewed documentation from other physicians and other health care professional services - Reviewed medical records and diagnostic / radiology study results - Coordination with patient's support system  ************************************************************************  MEDICATION REVIEW:  - See Medication List Above    ISTOP REFERENCE:   - no active Rx's / see ISTOP Chart Note  - PRN usage: NO PRN'S  ------------------------------------------------------------------------  COORDINATION OF CARE:  - Palliative Care consulted for: GOC / Symptom Management  - Patient (to be) assessed:  - Patient previously seen by Palliative Care service: NO    ADVANCE CARE PLANNING  - Code status: FULL  - MOLST reviewed in chart: NONE; None found on Alpha  - HCP/ Surrogate: NONE found on Alpha  - GOC documents: NONE found on Alpha  - HCP/ Living will/Other Advanced Directives in Alpha: NONE found on Alpha  ------------------------------------------------------------------------  CARE PROVIDER DOCUMENTATION:  - SW/CM notes: pending auth for [facility]  - Medicine / Ortho notes: will need DVT prophylaxis x 30 days post-op    PLAN OF CARE  - Known admissions in past year: 1  - Current admit date: 7/25/21  - LOS: 4  - LACE score: 13  - Current dispo plan: [facility]  ------------------------------------------------------------------------  - Time Spent/Chart reviewed: 31 Minutes [including time used to gather, review and transfer data]  - Start: 10:00AM  - End: 10:31AM    Prolonged services rendered, as part of this patient's care provided by Palliative Medicine, include: i. chart review for provider and ancillary service documentation, ii. pertinent diagnostics including laboratory and imaging studies, iii. medication review including PRN use, iv. admission history including previous palliative care encounters and GOC notes, v. advance care planning documents including HCP and MOLST forms in Alpha. Part of Palliative Medicine extended evaluation and management also involves coordination of care with our IDT, the primary and consulting teams, and unit CM/SW and Hospice if eligible. Recommendations based on the information gathered and discussed are outlined in the A/P of Palliative notes.

## 2021-07-30 ENCOUNTER — TRANSCRIPTION ENCOUNTER (OUTPATIENT)
Age: 81
End: 2021-07-30

## 2021-07-30 VITALS
OXYGEN SATURATION: 95 % | RESPIRATION RATE: 18 BRPM | DIASTOLIC BLOOD PRESSURE: 76 MMHG | SYSTOLIC BLOOD PRESSURE: 115 MMHG | TEMPERATURE: 99 F | HEART RATE: 78 BPM

## 2021-07-30 LAB
CULTURE RESULTS: SIGNIFICANT CHANGE UP
CULTURE RESULTS: SIGNIFICANT CHANGE UP
SPECIMEN SOURCE: SIGNIFICANT CHANGE UP
SPECIMEN SOURCE: SIGNIFICANT CHANGE UP

## 2021-07-30 PROCEDURE — 83605 ASSAY OF LACTIC ACID: CPT

## 2021-07-30 PROCEDURE — 96374 THER/PROPH/DIAG INJ IV PUSH: CPT

## 2021-07-30 PROCEDURE — 87635 SARS-COV-2 COVID-19 AMP PRB: CPT

## 2021-07-30 PROCEDURE — 73564 X-RAY EXAM KNEE 4 OR MORE: CPT

## 2021-07-30 PROCEDURE — 83735 ASSAY OF MAGNESIUM: CPT

## 2021-07-30 PROCEDURE — 99285 EMERGENCY DEPT VISIT HI MDM: CPT | Mod: 25

## 2021-07-30 PROCEDURE — 85730 THROMBOPLASTIN TIME PARTIAL: CPT

## 2021-07-30 PROCEDURE — 87040 BLOOD CULTURE FOR BACTERIA: CPT

## 2021-07-30 PROCEDURE — 36415 COLL VENOUS BLD VENIPUNCTURE: CPT

## 2021-07-30 PROCEDURE — 80048 BASIC METABOLIC PNL TOTAL CA: CPT

## 2021-07-30 PROCEDURE — G0378: CPT

## 2021-07-30 PROCEDURE — 84100 ASSAY OF PHOSPHORUS: CPT

## 2021-07-30 PROCEDURE — 85610 PROTHROMBIN TIME: CPT

## 2021-07-30 PROCEDURE — 85027 COMPLETE CBC AUTOMATED: CPT

## 2021-07-30 PROCEDURE — 93971 EXTREMITY STUDY: CPT

## 2021-07-30 PROCEDURE — 81003 URINALYSIS AUTO W/O SCOPE: CPT

## 2021-07-30 PROCEDURE — 99239 HOSP IP/OBS DSCHRG MGMT >30: CPT | Mod: GC

## 2021-07-30 PROCEDURE — 86769 SARS-COV-2 COVID-19 ANTIBODY: CPT

## 2021-07-30 PROCEDURE — 80053 COMPREHEN METABOLIC PANEL: CPT

## 2021-07-30 PROCEDURE — 85025 COMPLETE CBC W/AUTO DIFF WBC: CPT

## 2021-07-30 PROCEDURE — 87086 URINE CULTURE/COLONY COUNT: CPT

## 2021-07-30 PROCEDURE — 97161 PT EVAL LOW COMPLEX 20 MIN: CPT

## 2021-07-30 PROCEDURE — 97116 GAIT TRAINING THERAPY: CPT

## 2021-07-30 PROCEDURE — 71046 X-RAY EXAM CHEST 2 VIEWS: CPT

## 2021-07-30 PROCEDURE — 70450 CT HEAD/BRAIN W/O DYE: CPT | Mod: MA

## 2021-07-30 RX ORDER — AMLODIPINE BESYLATE 2.5 MG/1
1 TABLET ORAL
Qty: 0 | Refills: 0 | DISCHARGE
Start: 2021-07-30

## 2021-07-30 RX ORDER — ENOXAPARIN SODIUM 100 MG/ML
40 INJECTION SUBCUTANEOUS
Qty: 0 | Refills: 0 | DISCHARGE
Start: 2021-07-30

## 2021-07-30 RX ORDER — AMLODIPINE BESYLATE 2.5 MG/1
1 TABLET ORAL
Qty: 0 | Refills: 0 | DISCHARGE

## 2021-07-30 RX ADMIN — OXYCODONE HYDROCHLORIDE 10 MILLIGRAM(S): 5 TABLET ORAL at 12:28

## 2021-07-30 RX ADMIN — AMLODIPINE BESYLATE 10 MILLIGRAM(S): 2.5 TABLET ORAL at 05:48

## 2021-07-30 RX ADMIN — ENOXAPARIN SODIUM 40 MILLIGRAM(S): 100 INJECTION SUBCUTANEOUS at 05:50

## 2021-07-30 RX ADMIN — POLYETHYLENE GLYCOL 3350 17 GRAM(S): 17 POWDER, FOR SOLUTION ORAL at 12:28

## 2021-07-30 RX ADMIN — OXYCODONE HYDROCHLORIDE 10 MILLIGRAM(S): 5 TABLET ORAL at 20:22

## 2021-07-30 NOTE — DISCHARGE NOTE NURSING/CASE MANAGEMENT/SOCIAL WORK - PATIENT PORTAL LINK FT
You can access the FollowMyHealth Patient Portal offered by [facility] by registering at the following website: [email or web address]. By joining [facility]’s FollowMyHealth portal, you will also be able to view your health information using other applications (apps) compatible with our system.

## 2021-07-30 NOTE — PROGRESS NOTE ADULT - SUBJECTIVE AND OBJECTIVE BOX
Patient was seen and examined by me at bedside. I agree with resident's note, subjective, objective physical exam, assessment and plan with following modifications/additions.    Greater than 35 minutes spent on total encounter; more than 50% of the visit was spent counseling and/or coordinating care by the attending physician.    81F w/ PMHx HTN, HLD, POD 6 R knee replacement complicated by 3 falls after surgery and difficulty ambulating, seen by ortho and films reviewed on further surgery planned, plan for LIMA   -appreciate ortho eval of wound yest- no issues noted, no further imaging needed, will f/u as outpt. c/w pain regimen and bowel regimen  -C/w HTN and HL meds   -DVT px- continue lovenox on dc to [facility] for DVT px  -Dispo- to [facility] today- has bed and received auth

## 2021-07-30 NOTE — PROGRESS NOTE ADULT - REASON FOR ADMISSION
frequent falls

## 2021-07-30 NOTE — PROGRESS NOTE ADULT - PROVIDER SPECIALTY LIST ADULT
Hospitalist
Internal Medicine
Hospitalist
Internal Medicine
Orthopedics
Hospitalist
Internal Medicine
Internal Medicine

## 2021-08-09 DIAGNOSIS — E78.5 HYPERLIPIDEMIA, UNSPECIFIED: ICD-10-CM

## 2021-08-09 DIAGNOSIS — Z51.5 ENCOUNTER FOR PALLIATIVE CARE: ICD-10-CM

## 2021-08-09 DIAGNOSIS — R26.2 DIFFICULTY IN WALKING, NOT ELSEWHERE CLASSIFIED: ICD-10-CM

## 2021-08-09 DIAGNOSIS — K59.09 OTHER CONSTIPATION: ICD-10-CM

## 2021-08-09 DIAGNOSIS — M17.11 UNILATERAL PRIMARY OSTEOARTHRITIS, RIGHT KNEE: ICD-10-CM

## 2021-08-09 DIAGNOSIS — Z87.891 PERSONAL HISTORY OF NICOTINE DEPENDENCE: ICD-10-CM

## 2021-08-09 DIAGNOSIS — Z96.651 PRESENCE OF RIGHT ARTIFICIAL KNEE JOINT: ICD-10-CM

## 2021-08-09 DIAGNOSIS — R29.6 REPEATED FALLS: ICD-10-CM

## 2021-08-09 DIAGNOSIS — Z71.3 DIETARY COUNSELING AND SURVEILLANCE: ICD-10-CM

## 2021-08-09 DIAGNOSIS — I12.9 HYPERTENSIVE CHRONIC KIDNEY DISEASE WITH STAGE 1 THROUGH STAGE 4 CHRONIC KIDNEY DISEASE, OR UNSPECIFIED CHRONIC KIDNEY DISEASE: ICD-10-CM

## 2021-08-09 DIAGNOSIS — N18.30 CHRONIC KIDNEY DISEASE, STAGE 3 UNSPECIFIED: ICD-10-CM

## 2021-08-09 DIAGNOSIS — K21.9 GASTRO-ESOPHAGEAL REFLUX DISEASE WITHOUT ESOPHAGITIS: ICD-10-CM

## 2021-08-10 ENCOUNTER — APPOINTMENT (OUTPATIENT)
Dept: NEPHROLOGY | Facility: CLINIC | Age: 81
End: 2021-08-10

## 2021-08-23 ENCOUNTER — APPOINTMENT (OUTPATIENT)
Dept: ORTHOPEDIC SURGERY | Facility: CLINIC | Age: 81
End: 2021-08-23
Payer: MEDICARE

## 2021-08-23 PROBLEM — E78.5 HYPERLIPIDEMIA, UNSPECIFIED: Chronic | Status: ACTIVE | Noted: 2021-07-25

## 2021-08-23 PROCEDURE — 99024 POSTOP FOLLOW-UP VISIT: CPT

## 2021-08-23 NOTE — HISTORY OF PRESENT ILLNESS
[de-identified] : Patient is here status post proximal knee 7 weeks right knee replacement she is currently at rehabilitation. She is doing physical therapy and daily basis.

## 2021-08-23 NOTE — PHYSICAL EXAM
[de-identified] : Range of motion is 8-90° wound is well-healed she is neurovascularly intact distally.

## 2021-08-23 NOTE — DISCUSSION/SUMMARY
[de-identified] : We deferred x-rays until the next visit I encouraged the patient continue her physical therapy improve her range of motion follow up in one month's time.

## 2021-09-20 ENCOUNTER — APPOINTMENT (OUTPATIENT)
Dept: ORTHOPEDIC SURGERY | Facility: CLINIC | Age: 81
End: 2021-09-20
Payer: MEDICARE

## 2021-09-20 PROCEDURE — 99024 POSTOP FOLLOW-UP VISIT: CPT

## 2021-09-20 NOTE — REASON FOR VISIT
[FreeTextEntry2] : s/p right knee replaced 7/21 - first post op - says she fell 3x since surgery  [Post Operative Visit] : a post operative visit for

## 2021-09-20 NOTE — DISCUSSION/SUMMARY
[de-identified] : Patient followup with us around Thursday at that point I think getting ready to be much easier for her. She is being discharged home in 48 hours from her subacute rehabilitation.

## 2021-09-20 NOTE — PHYSICAL EXAM
[de-identified] : Right knee has minimal warmth wound is well-healed range of motion is 0-125°. He states a distress was brought [de-identified] : X-rays deferred until next visit.

## 2021-09-20 NOTE — HISTORY OF PRESENT ILLNESS
[de-identified] : Patient is here 2 months status post right knee replacement. She is still at a nursing home/rehabilitation. She seems to be improving she has minimal complaints of pain no stiffness.

## 2021-12-06 ENCOUNTER — APPOINTMENT (OUTPATIENT)
Dept: ORTHOPEDIC SURGERY | Facility: CLINIC | Age: 81
End: 2021-12-06
Payer: MEDICARE

## 2021-12-06 PROCEDURE — 99212 OFFICE O/P EST SF 10 MIN: CPT

## 2021-12-06 NOTE — PHYSICAL EXAM
[de-identified] : Right knee range of motion 0-125° and is well healed and minimal warmth minimal soft tissue swelling is noted to be stable.

## 2021-12-06 NOTE — HISTORY OF PRESENT ILLNESS
[de-identified] : Patient is status post July 21 knee replacement surgery right side well she is doing outpatient physical therapy.

## 2021-12-06 NOTE — DISCUSSION/SUMMARY
[de-identified] : Patient will continue with her physical therapy regimen with radiographs on the next visit.

## 2022-01-21 ENCOUNTER — APPOINTMENT (OUTPATIENT)
Dept: ORTHOPEDIC SURGERY | Facility: CLINIC | Age: 82
End: 2022-01-21
Payer: MEDICARE

## 2022-01-21 PROCEDURE — 99213 OFFICE O/P EST LOW 20 MIN: CPT

## 2022-01-21 PROCEDURE — 73562 X-RAY EXAM OF KNEE 3: CPT | Mod: RT

## 2022-01-21 NOTE — PHYSICAL EXAM
[de-identified] : Right knee has some quadriceps atrophy compared to the left. Wound is well-healed range of motion 0-135° and is stable to stress varus valgus stress in both full extension and 90° of flexion. [de-identified] : Knee radiographs were ordered today these are the first radiographs status post knee replacement surgery. Previously there are issues with the x-ray unit. AP standing and lateral sunrise view the patient will well positioned Smith [employer] Nephew-type right knee prosthesis. Statement Selected

## 2022-01-21 NOTE — REASON FOR VISIT
[Follow-Up Visit] : a follow-up visit for [FreeTextEntry2] : FOLLOW UP JULY 21, 2021 R KNEE REPLACEMENT

## 2022-01-21 NOTE — DISCUSSION/SUMMARY
[Medication Risks Reviewed] : Medication risks reviewed [de-identified] : Patient some moderate atrophy her right quad we will place her in a Genutrain-type brace for support also order home therapy specific to improving quad strength in addition she is given Celebrex 200 mg p.o. b.i.d. for 3 day course and be taken thereafter as needed for intermittent pain.

## 2022-01-21 NOTE — HISTORY OF PRESENT ILLNESS
[de-identified] : Patient is 6 months status post right knee replacement. She is here because of the sense of the right knee giving way otherwise she has no pain good range of motion.

## 2022-02-18 NOTE — ED PROVIDER NOTE - PSH
Thank you for participating in our COVID Antibody Project for patients with blood cancer (e.g. leukemia, lymphoma, myeloma, and other malignant diseases affecting the bone marrow).  Your Cancer Care Provider placed the following orders for you.  It is very important that you complete the steps in the below order.      1. Schedule 1st blood draw for “COVID IgG Watson Protein” TODAY, unless already drawn prior to leaving the office.    2. Schedule your COVID Vaccine 3rd dose (“Booster”) as soon as possible AFTER 1st blood draw is complete  3. Schedule 2nd blood draw for “COVID IgG Watson Protein”  3-5 weeks AFTER you receive your vaccine.     Your results will be posted in [facility] Account within 3 business days. Contact your Cancer Care Provider with any questions/concerns.    If you do not have a [facility] Account, please create one, we are happy to help with the set up.  Please also see specific instructions below.      
H/O total knee replacement, right

## 2022-04-05 ENCOUNTER — APPOINTMENT (OUTPATIENT)
Dept: OTOLARYNGOLOGY | Facility: CLINIC | Age: 82
End: 2022-04-05
Payer: MEDICARE

## 2022-04-05 PROCEDURE — 92550 TYMPANOMETRY & REFLEX THRESH: CPT

## 2022-04-05 PROCEDURE — 92557 COMPREHENSIVE HEARING TEST: CPT

## 2022-05-19 ENCOUNTER — APPOINTMENT (OUTPATIENT)
Dept: ORTHOPEDIC SURGERY | Facility: CLINIC | Age: 82
End: 2022-05-19

## 2022-05-19 VITALS
HEART RATE: 73 BPM | HEIGHT: 65 IN | BODY MASS INDEX: 25.33 KG/M2 | RESPIRATION RATE: 18 BRPM | OXYGEN SATURATION: 98 % | DIASTOLIC BLOOD PRESSURE: 87 MMHG | WEIGHT: 152 LBS | SYSTOLIC BLOOD PRESSURE: 128 MMHG

## 2022-05-19 PROCEDURE — 73610 X-RAY EXAM OF ANKLE: CPT | Mod: LT

## 2022-05-19 PROCEDURE — 99214 OFFICE O/P EST MOD 30 MIN: CPT

## 2022-05-19 NOTE — HISTORY OF PRESENT ILLNESS
[de-identified] : Patient returns today status post 10 months right knee replacement with which she is quite pleased.  She has new complaints of left ankle pain and swelling for the last 2 weeks and also chronic worsening left knee pain.  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  In the past we talked about possible knee replacement surgery whenever symptoms were no longer controllable with conservative measures.  The left ankle is a new issue patient recalls no specific accident or injury but in the last 2 weeks noted a fair amount of swelling and difficulty ambulating because of pain.  She currently ambulates best with a walker both inside the house and outdoors.  She put a compression stocking onto her left ankle which seems to help with the swelling the last few days.

## 2022-05-19 NOTE — PHYSICAL EXAM
[de-identified] : Left knee range of motion 0 to 125 degrees there is crepitus with range of motion soft tissue swelling is noted warmth tenderness with compression of the patellofemoral lateral facet.  The knee is stable to AP stress varus valgus stress in full extension and 90 degrees of flexion.  Left ankle has 1/2 swelling at the mortise.  There is however no warmth the ankle stable to AP stress inversion stress she has good plantar flexion strength Achilles tendon is intact no tenderness to palpation over the posterior aspect of the medial or lateral malleolus.  Posterior tibial tendon function is intact. [de-identified] : 3 views of the left ankle were obtained showing mild degenerative changes evidenced by just some narrowing of the tibiotalar articulation.  At least 80% of the cartilage is well-maintained minimal secondary findings of arthritis such as osteophyte or cyst formation.  No evidence of any fracture or recent injury.

## 2022-05-19 NOTE — PROCEDURE
[de-identified] : Patient was given a cortisone injection (Lidocaine 1% 4 cc + 10 mg of Kenalog) in the lateral compartment of the left  knee. Injection is performed under sterile conditions with ultrasound guidance. Patient tolerated procedure well. If patient has a history of insulin- dependant diabetes they were informed of possible increase of blood glucose levels and instructed to adjust insulin accordingly.\par \par

## 2022-05-19 NOTE — DISCUSSION/SUMMARY
[Medication Risks Reviewed] : Medication risks reviewed [de-identified] : Patient I discussed at length knee replacement surgery for the left knee she will consider this option.  Patient is also told that we could utilize cortisone injections and/or HA injections as temporizing measures to get her through the summer.  .  She will consider knee replacement and return to us in 3 weeks.\par \par Left ankle exam today is consistent with mild osteoarthritic flare.  However on today's exam there is no warmth range of motion is full by the patient's report the swelling is improved significantly.  To help speed up her improvement we will simply place her on Celebrex 200 mg p.o. twice daily for 3-day course then to be taken thereafter as needed this will help address both knee pain and residual ankle pain she will follow-up in 3 weeks time.

## 2022-07-14 ENCOUNTER — APPOINTMENT (OUTPATIENT)
Dept: ORTHOPEDIC SURGERY | Facility: CLINIC | Age: 82
End: 2022-07-14

## 2022-07-14 VITALS — HEIGHT: 65 IN | WEIGHT: 145 LBS | BODY MASS INDEX: 24.16 KG/M2

## 2022-07-14 PROCEDURE — 99214 OFFICE O/P EST MOD 30 MIN: CPT

## 2022-07-14 RX ORDER — ROSUVASTATIN CALCIUM 5 MG/1
5 TABLET, FILM COATED ORAL
Qty: 90 | Refills: 0 | Status: ACTIVE | COMMUNITY
Start: 2022-03-30

## 2022-07-14 RX ORDER — METHYLPREDNISOLONE 4 MG/1
4 TABLET ORAL
Qty: 1 | Refills: 3 | Status: ACTIVE | COMMUNITY
Start: 2022-07-14 | End: 1900-01-01

## 2022-07-14 RX ORDER — AMLODIPINE BESYLATE 5 MG/1
5 TABLET ORAL
Qty: 90 | Refills: 0 | Status: ACTIVE | COMMUNITY
Start: 2022-03-30

## 2022-07-14 NOTE — DISCUSSION/SUMMARY
[Surgical risks reviewed] : Surgical risks reviewed [de-identified] : Surgical risks reviewed. The reasonable risks and benefits of knee replacement surgery discussed in detail with the patient. Patient asked appropriate questions which were answered to their satisfaction. We talked about potential complications including complications they may require revision surgery such as component loosening failure migration wear and also potential complications of infection and stiffness.\par \par We also talked at length about implant type and the etiology of her underlying pain.  We talked about typical convalescence expectations and surgical approach as well.

## 2022-07-14 NOTE — PHYSICAL EXAM
[de-identified] : Left knee exam today patient has definite medial joint line tenderness as well as compression tenderness of the patellofemoral articulation range of motion 0 to 125 degrees the knee is warm there is some soft tissue swelling but no obvious effusion she is neurovascular intact distally. [de-identified] : Recent radiographs were reviewed today from January of this year showing severe tricompartmental osteoarthritis in the left knee most advanced in the patellofemoral compartment.

## 2022-07-14 NOTE — HISTORY OF PRESENT ILLNESS
[de-identified] : Patient returns today she states she is doing quite well with her right knee replacement she has noticed increasing pain and discomfort on the left knee similar to her preoperative complaints of her right knee.  She has difficulty with stair climbing is mostly reverting to using a walker as opposed to a cane recently.  She recalls no specific recent accident or injury pain is well localized to the medial as well as anterior aspect of the knee.

## 2022-07-14 NOTE — REASON FOR VISIT
[FreeTextEntry2] : Pt would like to discuss about Lt knee replacement.  Hx.Rt knee replacement in Jul, 2021 by

## 2022-09-01 ENCOUNTER — APPOINTMENT (OUTPATIENT)
Dept: ORTHOPEDIC SURGERY | Facility: CLINIC | Age: 82
End: 2022-09-01

## 2022-09-01 ENCOUNTER — OUTPATIENT (OUTPATIENT)
Dept: OUTPATIENT SERVICES | Facility: HOSPITAL | Age: 82
LOS: 1 days | End: 2022-09-01
Payer: MEDICARE

## 2022-09-01 ENCOUNTER — RESULT REVIEW (OUTPATIENT)
Age: 82
End: 2022-09-01

## 2022-09-01 DIAGNOSIS — Z96.651 PRESENCE OF RIGHT ARTIFICIAL KNEE JOINT: Chronic | ICD-10-CM

## 2022-09-01 PROCEDURE — 73562 X-RAY EXAM OF KNEE 3: CPT

## 2022-09-01 PROCEDURE — 99214 OFFICE O/P EST MOD 30 MIN: CPT

## 2022-09-01 PROCEDURE — 73562 X-RAY EXAM OF KNEE 3: CPT | Mod: 26,50

## 2022-09-01 NOTE — REASON FOR VISIT
[Initial Visit] : an initial visit for [FreeTextEntry2] : right knee pain x 1 month , s/p TKA 07/2021, now ambulating with walker, pain isolated to just the knee, no falls/ injuries

## 2022-09-01 NOTE — HISTORY OF PRESENT ILLNESS
[de-identified] : This is a well-known patient status post 1 year right knee replacement she is here because of 2 issues 1 is a recent 1 month history of right knee pain status post slight fall at home she states that improvement she is also here to discuss left knee replacement surgery.  Patient has chronic advanced osteoarthritis of the left knee complete cartilage loss of the patellofemoral articulation.

## 2022-09-01 NOTE — DISCUSSION/SUMMARY
[de-identified] : As far as the right knee is concerned patient had recent strain she seems to be improving we talked about potentially using Celebrex 200 mg p.o. twice daily for 4-day course then taken thereafter as needed for some of her remaining symptoms.  Reasonable risk and benefits of medication were discussed in detail including side effect profile.  Patient's current medications were reviewed there is no contraindication to its limited use.\par \par For patient's left knee patient is anxious to move forward with knee replacement surgery she had planned on doing so sometime between now and Thanksgiving.  Patient is familiar with the reasonable risks and benefits of the procedure but they were discussed again in detail.\par Surgical risks reviewed. The reasonable risks and benefits of knee replacement surgery discussed in detail with the patient. Patient asked appropriate questions which were answered to their satisfaction. We talked about potential complications including complications they may require revision surgery such as component loosening failure migration wear and also potential complications of infection and stiffness.\par \par

## 2022-09-01 NOTE — PHYSICAL EXAM
[de-identified] : Right knee there is some warmth about it range of motion 0 to 125 degrees the knee is stable to stress varus valgus stress mild crepitus with range of motion.\par \par Left knee has definite patellofemoral tenderness with compression there is range of motion 0 to 125 degrees with crepitus.  She is neurovascular intact distally soft tissue swelling is noted no obvious effusion knee stable to AP stress varus valgus stress in both full extension and 90 degrees of flexion. [de-identified] : X-rays were ordered of the knees today.  AP standing individual lateral and sunrise views were obtained showing a well-positioned Smith [employer] Nephew type right knee prosthesis in good position mild patella lateral subluxation on sunrise view but consistent with previous radiographs.\par \par Left knee has complete obliteration of patellofemoral articulation on both sunrise and side view.  There is fairly advanced tibiofemoral articulation arthritis noted on standing AP projection as well.

## 2022-10-03 ENCOUNTER — LABORATORY RESULT (OUTPATIENT)
Age: 82
End: 2022-10-03

## 2022-10-03 RX ORDER — POVIDONE-IODINE 5 %
1 AEROSOL (ML) TOPICAL ONCE
Refills: 0 | Status: COMPLETED | OUTPATIENT
Start: 2022-10-05 | End: 2022-10-05

## 2022-10-03 NOTE — H&P ADULT - HISTORY OF PRESENT ILLNESS
82F c/o left knee pain x       Present for elective left total knee replacement  82F c/o left knee pain x over 1 year without accident or injury to bring on pain. Pt reports she had her right knee replaced 1 year ago and has had left knee surgery planned to follow. Pain persists despite conservative measures. She uses a walker at home, but brought her cane today.   Pt required LIMA placement after prior TKA last year, was discharged home, fell twice and required another admission to [facility] due to her falls.   Present for elective left total knee replacement

## 2022-10-03 NOTE — H&P ADULT - NSHPLABSRESULTS_GEN_ALL_CORE
Preop CBC, BMP, PT/INR, UA (+ leuk) - WNL per medical clearance   PTT DOS  Cr .96  Preop CXR - WNL per medical clearance   Preop EKG - sinus rhythm - WNL per medical clearance  Echo 9/22/22 EF 55-60%   3M DOS

## 2022-10-03 NOTE — H&P ADULT - PROBLEM SELECTOR PLAN 1
Admit to Orthopaedic Service.  Presents today for elective left TKR   Pt medically stable and cleared for procedure today by Dr. Howard

## 2022-10-03 NOTE — H&P ADULT - NSHPPHYSICALEXAM_GEN_ALL_CORE
MSK: + decreased ROM 2/2 pain, left knee      Remainder of exam per medical clearance note MSK: + decreased ROM 2/2 pain, left knee  MSK: No deformity or open wounds. No rashes or lesions. EHL/TA/GS/FHL 5/5 BLE. Sensation intact and equal BLE. Skin warm and well perfused. DP palpable BLE. Cap refill brisk. Negative robe BLE.   Remainder of exam per medical clearance note

## 2022-10-04 ENCOUNTER — TRANSCRIPTION ENCOUNTER (OUTPATIENT)
Age: 82
End: 2022-10-04

## 2022-10-04 NOTE — ASU PATIENT PROFILE, ADULT - FALL HARM RISK - RISK INTERVENTIONS

## 2022-10-04 NOTE — ASU PATIENT PROFILE, ADULT - NSICDXPASTMEDICALHX_GEN_ALL_CORE_FT
PAST MEDICAL HISTORY:  Arthritis both knees    GERD (gastroesophageal reflux disease)     HLD (hyperlipidemia)     HTN (hypertension)      PAST MEDICAL HISTORY:  Arthritis both knees    CKD (chronic kidney disease)     Gastritis     GERD (gastroesophageal reflux disease)     HLD (hyperlipidemia)     HTN (hypertension)     Pulmonary HTN

## 2022-10-04 NOTE — ASU PATIENT PROFILE, ADULT - FALL HARM RISK - UNIVERSAL INTERVENTIONS
Bed in lowest position, wheels locked, appropriate side rails in place/Call bell, personal items and telephone in reach/Instruct patient to call for assistance before getting out of bed or chair/Non-slip footwear when patient is out of bed/[place] to call system/Physically safe environment - no spills, clutter or unnecessary equipment/Purposeful Proactive Rounding/Room/bathroom lighting operational, light cord in reach

## 2022-10-05 ENCOUNTER — RESULT REVIEW (OUTPATIENT)
Age: 82
End: 2022-10-05

## 2022-10-05 ENCOUNTER — INPATIENT (INPATIENT)
Facility: HOSPITAL | Age: 82
LOS: 4 days | Discharge: NURSING, REHAB OR LONG-TERM CARE | DRG: 470 | End: 2022-10-10
Attending: SPECIALIST | Admitting: SPECIALIST
Payer: MEDICARE

## 2022-10-05 ENCOUNTER — APPOINTMENT (OUTPATIENT)
Dept: ORTHOPEDIC SURGERY | Facility: HOSPITAL | Age: 82
End: 2022-10-05

## 2022-10-05 VITALS
DIASTOLIC BLOOD PRESSURE: 81 MMHG | TEMPERATURE: 98 F | WEIGHT: 148.81 LBS | SYSTOLIC BLOOD PRESSURE: 136 MMHG | RESPIRATION RATE: 18 BRPM | OXYGEN SATURATION: 99 % | HEART RATE: 78 BPM | HEIGHT: 65 IN

## 2022-10-05 DIAGNOSIS — K21.9 GASTRO-ESOPHAGEAL REFLUX DISEASE WITHOUT ESOPHAGITIS: ICD-10-CM

## 2022-10-05 DIAGNOSIS — M19.90 UNSPECIFIED OSTEOARTHRITIS, UNSPECIFIED SITE: ICD-10-CM

## 2022-10-05 DIAGNOSIS — E78.5 HYPERLIPIDEMIA, UNSPECIFIED: ICD-10-CM

## 2022-10-05 DIAGNOSIS — I10 ESSENTIAL (PRIMARY) HYPERTENSION: ICD-10-CM

## 2022-10-05 DIAGNOSIS — Z96.651 PRESENCE OF RIGHT ARTIFICIAL KNEE JOINT: Chronic | ICD-10-CM

## 2022-10-05 LAB
APTT BLD: 28.2 SEC — SIGNIFICANT CHANGE UP (ref 27.5–35.5)
BLD GP AB SCN SERPL QL: NEGATIVE — SIGNIFICANT CHANGE UP
GLUCOSE BLDC GLUCOMTR-MCNC: 97 MG/DL — SIGNIFICANT CHANGE UP (ref 70–99)
RH IG SCN BLD-IMP: POSITIVE — SIGNIFICANT CHANGE UP

## 2022-10-05 PROCEDURE — 73560 X-RAY EXAM OF KNEE 1 OR 2: CPT | Mod: 26,LT

## 2022-10-05 PROCEDURE — 27447 TOTAL KNEE ARTHROPLASTY: CPT | Mod: LT

## 2022-10-05 PROCEDURE — 27447 TOTAL KNEE ARTHROPLASTY: CPT | Mod: AS,LT

## 2022-10-05 DEVICE — FEM COMP JRNY II BCS BICRUCIATE LT SZ 5: Type: IMPLANTABLE DEVICE | Site: LEFT | Status: FUNCTIONAL

## 2022-10-05 DEVICE — IMPLANTABLE DEVICE: Type: IMPLANTABLE DEVICE | Site: LEFT | Status: FUNCTIONAL

## 2022-10-05 DEVICE — INSERT ART JRNY II BCS XLPE SZ 3-4 9MM LT: Type: IMPLANTABLE DEVICE | Site: LEFT | Status: FUNCTIONAL

## 2022-10-05 DEVICE — BASEPLATE TIB JRNY NP SZ 3 LT: Type: IMPLANTABLE DEVICE | Site: LEFT | Status: FUNCTIONAL

## 2022-10-05 DEVICE — PATELLA 7.5 32MM: Type: IMPLANTABLE DEVICE | Site: LEFT | Status: FUNCTIONAL

## 2022-10-05 DEVICE — CEMENT PALACOS R: Type: IMPLANTABLE DEVICE | Site: LEFT | Status: FUNCTIONAL

## 2022-10-05 RX ORDER — SENNA PLUS 8.6 MG/1
2 TABLET ORAL ONCE
Refills: 0 | Status: COMPLETED | OUTPATIENT
Start: 2022-10-05 | End: 2022-10-07

## 2022-10-05 RX ORDER — ACETAMINOPHEN 500 MG
650 TABLET ORAL EVERY 6 HOURS
Refills: 0 | Status: COMPLETED | OUTPATIENT
Start: 2022-10-05 | End: 2022-10-08

## 2022-10-05 RX ORDER — CEFAZOLIN SODIUM 1 G
2000 VIAL (EA) INJECTION EVERY 8 HOURS
Refills: 0 | Status: COMPLETED | OUTPATIENT
Start: 2022-10-05 | End: 2022-10-06

## 2022-10-05 RX ORDER — OXYCODONE HYDROCHLORIDE 5 MG/1
20 TABLET ORAL ONCE
Refills: 0 | Status: DISCONTINUED | OUTPATIENT
Start: 2022-10-05 | End: 2022-10-05

## 2022-10-05 RX ORDER — CHLORHEXIDINE GLUCONATE 213 G/1000ML
1 SOLUTION TOPICAL ONCE
Refills: 0 | Status: COMPLETED | OUTPATIENT
Start: 2022-10-05 | End: 2022-10-05

## 2022-10-05 RX ORDER — ATORVASTATIN CALCIUM 80 MG/1
20 TABLET, FILM COATED ORAL AT BEDTIME
Refills: 0 | Status: DISCONTINUED | OUTPATIENT
Start: 2022-10-05 | End: 2022-10-10

## 2022-10-05 RX ORDER — BENZOCAINE AND MENTHOL 5; 1 G/100ML; G/100ML
1 LIQUID ORAL ONCE
Refills: 0 | Status: DISCONTINUED | OUTPATIENT
Start: 2022-10-05 | End: 2022-10-10

## 2022-10-05 RX ORDER — ONDANSETRON 8 MG/1
4 TABLET, FILM COATED ORAL EVERY 6 HOURS
Refills: 0 | Status: DISCONTINUED | OUTPATIENT
Start: 2022-10-05 | End: 2022-10-10

## 2022-10-05 RX ORDER — OXYCODONE HYDROCHLORIDE 5 MG/1
5 TABLET ORAL EVERY 4 HOURS
Refills: 0 | Status: DISCONTINUED | OUTPATIENT
Start: 2022-10-05 | End: 2022-10-10

## 2022-10-05 RX ORDER — HYDROMORPHONE HYDROCHLORIDE 2 MG/ML
0.5 INJECTION INTRAMUSCULAR; INTRAVENOUS; SUBCUTANEOUS
Refills: 0 | Status: COMPLETED | OUTPATIENT
Start: 2022-10-05 | End: 2022-10-12

## 2022-10-05 RX ORDER — SODIUM CHLORIDE 9 MG/ML
1000 INJECTION, SOLUTION INTRAVENOUS
Refills: 0 | Status: DISCONTINUED | OUTPATIENT
Start: 2022-10-05 | End: 2022-10-10

## 2022-10-05 RX ORDER — PANTOPRAZOLE SODIUM 20 MG/1
40 TABLET, DELAYED RELEASE ORAL
Refills: 0 | Status: DISCONTINUED | OUTPATIENT
Start: 2022-10-05 | End: 2022-10-10

## 2022-10-05 RX ORDER — HYDROMORPHONE HYDROCHLORIDE 2 MG/ML
0.5 INJECTION INTRAMUSCULAR; INTRAVENOUS; SUBCUTANEOUS
Refills: 0 | Status: DISCONTINUED | OUTPATIENT
Start: 2022-10-05 | End: 2022-10-10

## 2022-10-05 RX ORDER — LANOLIN ALCOHOL/MO/W.PET/CERES
5 CREAM (GRAM) TOPICAL AT BEDTIME
Refills: 0 | Status: DISCONTINUED | OUTPATIENT
Start: 2022-10-05 | End: 2022-10-10

## 2022-10-05 RX ORDER — ASPIRIN/CALCIUM CARB/MAGNESIUM 324 MG
325 TABLET ORAL DAILY
Refills: 0 | Status: DISCONTINUED | OUTPATIENT
Start: 2022-10-06 | End: 2022-10-10

## 2022-10-05 RX ORDER — OXYCODONE HYDROCHLORIDE 5 MG/1
10 TABLET ORAL EVERY 4 HOURS
Refills: 0 | Status: DISCONTINUED | OUTPATIENT
Start: 2022-10-05 | End: 2022-10-10

## 2022-10-05 RX ORDER — ROSUVASTATIN CALCIUM 5 MG/1
1 TABLET ORAL
Qty: 0 | Refills: 0 | DISCHARGE

## 2022-10-05 RX ORDER — POLYETHYLENE GLYCOL 3350 17 G/17G
17 POWDER, FOR SOLUTION ORAL AT BEDTIME
Refills: 0 | Status: DISCONTINUED | OUTPATIENT
Start: 2022-10-05 | End: 2022-10-10

## 2022-10-05 RX ORDER — AMLODIPINE BESYLATE 2.5 MG/1
5 TABLET ORAL DAILY
Refills: 0 | Status: DISCONTINUED | OUTPATIENT
Start: 2022-10-06 | End: 2022-10-10

## 2022-10-05 RX ORDER — MAGNESIUM HYDROXIDE 400 MG/1
30 TABLET, CHEWABLE ORAL DAILY
Refills: 0 | Status: DISCONTINUED | OUTPATIENT
Start: 2022-10-05 | End: 2022-10-10

## 2022-10-05 RX ORDER — CELECOXIB 200 MG/1
400 CAPSULE ORAL ONCE
Refills: 0 | Status: COMPLETED | OUTPATIENT
Start: 2022-10-05 | End: 2022-10-05

## 2022-10-05 RX ADMIN — OXYCODONE HYDROCHLORIDE 20 MILLIGRAM(S): 5 TABLET ORAL at 12:50

## 2022-10-05 RX ADMIN — Medication 650 MILLIGRAM(S): at 18:58

## 2022-10-05 RX ADMIN — HYDROMORPHONE HYDROCHLORIDE 0.5 MILLIGRAM(S): 2 INJECTION INTRAMUSCULAR; INTRAVENOUS; SUBCUTANEOUS at 18:20

## 2022-10-05 RX ADMIN — Medication 650 MILLIGRAM(S): at 23:14

## 2022-10-05 RX ADMIN — HYDROMORPHONE HYDROCHLORIDE 0.5 MILLIGRAM(S): 2 INJECTION INTRAMUSCULAR; INTRAVENOUS; SUBCUTANEOUS at 18:05

## 2022-10-05 RX ADMIN — OXYCODONE HYDROCHLORIDE 10 MILLIGRAM(S): 5 TABLET ORAL at 19:30

## 2022-10-05 RX ADMIN — Medication 650 MILLIGRAM(S): at 18:28

## 2022-10-05 RX ADMIN — CHLORHEXIDINE GLUCONATE 1 APPLICATION(S): 213 SOLUTION TOPICAL at 11:22

## 2022-10-05 RX ADMIN — ATORVASTATIN CALCIUM 20 MILLIGRAM(S): 80 TABLET, FILM COATED ORAL at 23:15

## 2022-10-05 RX ADMIN — Medication 1 APPLICATION(S): at 11:22

## 2022-10-05 RX ADMIN — CELECOXIB 400 MILLIGRAM(S): 200 CAPSULE ORAL at 12:51

## 2022-10-05 RX ADMIN — OXYCODONE HYDROCHLORIDE 10 MILLIGRAM(S): 5 TABLET ORAL at 18:56

## 2022-10-05 RX ADMIN — Medication 100 MILLIGRAM(S): at 21:46

## 2022-10-05 NOTE — PHYSICAL THERAPY INITIAL EVALUATION ADULT - PERTINENT HX OF CURRENT PROBLEM, REHAB EVAL
82F c/o left knee pain x over 1 year without accident or injury to bring on pain. Pt reports she had her right knee replaced 1 year ago and has had left knee surgery planned to follow. Pain persists despite conservative measures. She uses a walker at home, but brought her cane today.   Pt required LIMA placement after prior TKA last year, was discharged home, fell twice and required another admission to [facility] due to her falls.   Present for elective left total knee replacement

## 2022-10-05 NOTE — PHYSICAL THERAPY INITIAL EVALUATION ADULT - GROSSLY INTACT, SENSORY
Grossly intact to light touch sensation throughout BLE and BUE. Denied numbness and tingling throughout BLE and BUE.

## 2022-10-05 NOTE — PHYSICAL THERAPY INITIAL EVALUATION ADULT - ADDITIONAL COMMENTS
Pt currently resides in 1st floor apartment w/  (however  currently in [place]), ramp access to enter. Pt states primarily amb w/ RW, denies falls within past 6 months. Indep w/ showering and dressing.

## 2022-10-05 NOTE — PRE-ANESTHESIA EVALUATION ADULT - NSANTHOSAYNRD_GEN_A_CORE
No. GERMANIA screening performed.  STOP BANG Legend: 0-2 = LOW Risk; 3-4 = INTERMEDIATE Risk; 5-8 = HIGH Risk

## 2022-10-05 NOTE — PHYSICAL THERAPY INITIAL EVALUATION ADULT - TRANSFER SAFETY CONCERNS NOTED: SIT/STAND, REHAB EVAL
VC given to maintain COG within MALISSA prior to transfer. VC also given for proper BUE hand placements on RW. Dec strength along BLE for push-off into standing noted./decreased sequencing ability/decreased step length/decreased weight-shifting ability

## 2022-10-05 NOTE — PHYSICAL THERAPY INITIAL EVALUATION ADULT - GAIT DEVIATIONS NOTED, PT EVAL
Pt w/ dec weight shifting abilities/slightly unsteady gait however no LOB/falls or knee buckling observed. Narrow MALISSA noted. Dec step length / bhavik noted. Minimal assist for RW management provided./decreased bhavik/increased time in double stance/decreased velocity of limb motion/decreased step length/decreased weight-shifting ability

## 2022-10-06 LAB
ANION GAP SERPL CALC-SCNC: 10 MMOL/L — SIGNIFICANT CHANGE UP (ref 5–17)
BUN SERPL-MCNC: 22 MG/DL — SIGNIFICANT CHANGE UP (ref 7–23)
CALCIUM SERPL-MCNC: 9.2 MG/DL — SIGNIFICANT CHANGE UP (ref 8.4–10.5)
CHLORIDE SERPL-SCNC: 104 MMOL/L — SIGNIFICANT CHANGE UP (ref 96–108)
CO2 SERPL-SCNC: 25 MMOL/L — SIGNIFICANT CHANGE UP (ref 22–31)
CREAT SERPL-MCNC: 0.93 MG/DL — SIGNIFICANT CHANGE UP (ref 0.5–1.3)
EGFR: 62 ML/MIN/1.73M2 — SIGNIFICANT CHANGE UP
GLUCOSE SERPL-MCNC: 119 MG/DL — HIGH (ref 70–99)
HCT VFR BLD CALC: 37.4 % — SIGNIFICANT CHANGE UP (ref 34.5–45)
HGB BLD-MCNC: 12.4 G/DL — SIGNIFICANT CHANGE UP (ref 11.5–15.5)
MCHC RBC-ENTMCNC: 29.2 PG — SIGNIFICANT CHANGE UP (ref 27–34)
MCHC RBC-ENTMCNC: 33.2 GM/DL — SIGNIFICANT CHANGE UP (ref 32–36)
MCV RBC AUTO: 88 FL — SIGNIFICANT CHANGE UP (ref 80–100)
NRBC # BLD: 0 /100 WBCS — SIGNIFICANT CHANGE UP (ref 0–0)
PLATELET # BLD AUTO: 208 K/UL — SIGNIFICANT CHANGE UP (ref 150–400)
POTASSIUM SERPL-MCNC: 4.9 MMOL/L — SIGNIFICANT CHANGE UP (ref 3.5–5.3)
POTASSIUM SERPL-SCNC: 4.9 MMOL/L — SIGNIFICANT CHANGE UP (ref 3.5–5.3)
RBC # BLD: 4.25 M/UL — SIGNIFICANT CHANGE UP (ref 3.8–5.2)
RBC # FLD: 14.2 % — SIGNIFICANT CHANGE UP (ref 10.3–14.5)
SODIUM SERPL-SCNC: 139 MMOL/L — SIGNIFICANT CHANGE UP (ref 135–145)
WBC # BLD: 7.37 K/UL — SIGNIFICANT CHANGE UP (ref 3.8–10.5)
WBC # FLD AUTO: 7.37 K/UL — SIGNIFICANT CHANGE UP (ref 3.8–10.5)

## 2022-10-06 PROCEDURE — 99222 1ST HOSP IP/OBS MODERATE 55: CPT

## 2022-10-06 RX ADMIN — Medication 1 TABLET(S): at 11:14

## 2022-10-06 RX ADMIN — OXYCODONE HYDROCHLORIDE 5 MILLIGRAM(S): 5 TABLET ORAL at 17:16

## 2022-10-06 RX ADMIN — Medication 325 MILLIGRAM(S): at 11:14

## 2022-10-06 RX ADMIN — ATORVASTATIN CALCIUM 20 MILLIGRAM(S): 80 TABLET, FILM COATED ORAL at 21:48

## 2022-10-06 RX ADMIN — POLYETHYLENE GLYCOL 3350 17 GRAM(S): 17 POWDER, FOR SOLUTION ORAL at 21:48

## 2022-10-06 RX ADMIN — Medication 650 MILLIGRAM(S): at 17:15

## 2022-10-06 RX ADMIN — AMLODIPINE BESYLATE 5 MILLIGRAM(S): 2.5 TABLET ORAL at 05:23

## 2022-10-06 RX ADMIN — PANTOPRAZOLE SODIUM 40 MILLIGRAM(S): 20 TABLET, DELAYED RELEASE ORAL at 05:23

## 2022-10-06 RX ADMIN — Medication 650 MILLIGRAM(S): at 18:15

## 2022-10-06 RX ADMIN — OXYCODONE HYDROCHLORIDE 5 MILLIGRAM(S): 5 TABLET ORAL at 18:16

## 2022-10-06 RX ADMIN — Medication 650 MILLIGRAM(S): at 05:23

## 2022-10-06 RX ADMIN — Medication 650 MILLIGRAM(S): at 12:14

## 2022-10-06 RX ADMIN — Medication 650 MILLIGRAM(S): at 00:00

## 2022-10-06 RX ADMIN — Medication 100 MILLIGRAM(S): at 05:23

## 2022-10-06 RX ADMIN — Medication 650 MILLIGRAM(S): at 11:14

## 2022-10-06 NOTE — CONSULT NOTE ADULT - SUBJECTIVE AND OBJECTIVE BOX
Patient is a 82y old  Female who presents with a chief complaint of left knee pain (06 Oct 2022 11:12)        HPI :  82 year old female admitted to the hospital for an elective Left Total Knee replacement, post operative she was able to ambulate with PT , She is complaining of left knee pain , otherwise she has no other symptoms     REVIEW OF SYSTEMS: 12 point review of systems negative except those stated else where in the note       PAST MEDICAL & SURGICAL HISTORY:  GERD (gastroesophageal reflux disease)      HTN (hypertension)      Arthritis  both knees      HLD (hyperlipidemia)      Gastritis      Pulmonary HTN      CKD (chronic kidney disease)      H/O total knee replacement, right          Social History:  Denies drug, alcohol and tobacco use. (03 Oct 2022 11:45)      FAMILY HISTORY:      Home Medications:  AMLODIPINE 5 MG    TAB (NORV..C): TAKE 1 TAB. BY MOUTH ONE TIME  A DAY..     FOR PRESSURE .. (05 Oct 2022 17:31)  amLODIPine 5 mg oral tablet: 1 tab(s) orally once a day (05 Oct 2022 17:31)  Crestor 5 mg oral tablet: 1 tab(s) orally once a day (05 Oct 2022 17:31)  ROSUVASTATIN - YEN  5  MG   TAB (MOISES..R): TAKE 1 TAB. BY MOUTH ONE TIME  A DAY..    FOR  CHOLESTEROL (05 Oct 2022 17:31)      MEDICATIONS  (STANDING):  acetaminophen     Tablet .. 650 milliGRAM(s) Oral every 6 hours  amLODIPine   Tablet 5 milliGRAM(s) Oral daily  aspirin 325 milliGRAM(s) Oral daily  atorvastatin 20 milliGRAM(s) Oral at bedtime  lactated ringers. 1000 milliLiter(s) (100 mL/Hr) IV Continuous <Continuous>  multivitamin 1 Tablet(s) Oral daily  pantoprazole    Tablet 40 milliGRAM(s) Oral before breakfast  polyethylene glycol 3350 17 Gram(s) Oral at bedtime  senna 2 Tablet(s) Oral Once    MEDICATIONS  (PRN):  benzocaine 15 mG/menthol 3.6 mG Lozenge 1 Lozenge Oral once PRN Sore Throat  HYDROmorphone  Injectable 0.5 milliGRAM(s) IV Push every 15 minutes PRN breaklthrough  magnesium hydroxide Suspension 30 milliLiter(s) Oral daily PRN Constipation  melatonin 5 milliGRAM(s) Oral at bedtime PRN Sleep  ondansetron Injectable 4 milliGRAM(s) IV Push every 6 hours PRN Nausea and/or Vomiting  oxyCODONE    IR 5 milliGRAM(s) Oral every 4 hours PRN Moderate Pain (4 - 6)  oxyCODONE    IR 10 milliGRAM(s) Oral every 4 hours PRN Severe Pain (7 - 10)      Vital Signs Last 24 Hrs  T(C): 36.7 (06 Oct 2022 08:25), Max: 36.7 (05 Oct 2022 21:30)  T(F): 98 (06 Oct 2022 08:25), Max: 98 (05 Oct 2022 21:30)  HR: 84 (06 Oct 2022 08:25) (72 - 100)  BP: 138/79 (06 Oct 2022 08:25) (132/88 - 156/83)  BP(mean): 99 (05 Oct 2022 21:30) (96 - 114)  RR: 18 (06 Oct 2022 08:25) (13 - 18)  SpO2: 97% (06 Oct 2022 08:25) (95% - 100%)      10-05-22 @ 07:01  -  10-06-22 @ 07:00  --------------------------------------------------------  IN: 100 mL / OUT: 250 mL / NET: -150 mL    10-06-22 @ 07:01  -  10-06-22 @ 15:09  --------------------------------------------------------  IN: 300 mL / OUT: 0 mL / NET: 300 mL        LABS:                        12.4   7.37  )-----------( 208      ( 06 Oct 2022 05:30 )             37.4     10-06    139  |  104  |  22  ----------------------------<  119<H>  4.9   |  25  |  0.93    Ca    9.2      06 Oct 2022 05:30      PTT - ( 05 Oct 2022 10:38 )  PTT:28.2 sec    RADIOLOGY & ADDITIONAL TESTS:    Imaging personally reviewed and radiologists report reviewed     Consultant(s) Notes Reviewed    PHYSICAL EXAM:  GENERAL: not in distress   HEAD, EYES: EOMI, PERRLA, conjunctiva and sclera clear  ENMT:  Moist mucous membranes, Good dentition, No lesions  NECK: Supple, No JVD, Normal thyroid  NERVOUS SYSTEM:  Alert & Oriented X3, Good concentration; Motor Strength 5/5 B/L upper and extremities;   CHEST/LUNG: Clear to auscultation  bilaterally; No rales, rhonchi, wheezing,   HEART: Regular rate and rhythm; No murmurs, rubs, or gallops  ABDOMEN: Soft, Nontender, Nondistended; Bowel sounds present  EXTREMITIES:  2+ Peripheral Pulses, No clubbing, cyanosis, or edema  LYMPH: No lymphadenopathy noted  SKIN: No rashes or lesions    Care Discussed with Primary team

## 2022-10-06 NOTE — CONSULT NOTE ADULT - ASSESSMENT
82 year old admitted for left knee pain     Impression and Plan     # Left TKR   - pain control , DVT prophylaxis , Weightbearing status , Dressing changes and drain care per ortho team    # HTN   # HLD   -Continue Amlodipine 5 mg daily   -Continue Rosuvastatin 5mg , if not available can substitute with Atorvastatin 20mg daily

## 2022-10-07 ENCOUNTER — TRANSCRIPTION ENCOUNTER (OUTPATIENT)
Age: 82
End: 2022-10-07

## 2022-10-07 LAB
ANION GAP SERPL CALC-SCNC: 9 MMOL/L — SIGNIFICANT CHANGE UP (ref 5–17)
BUN SERPL-MCNC: 15 MG/DL — SIGNIFICANT CHANGE UP (ref 7–23)
CALCIUM SERPL-MCNC: 9 MG/DL — SIGNIFICANT CHANGE UP (ref 8.4–10.5)
CHLORIDE SERPL-SCNC: 101 MMOL/L — SIGNIFICANT CHANGE UP (ref 96–108)
CO2 SERPL-SCNC: 27 MMOL/L — SIGNIFICANT CHANGE UP (ref 22–31)
CREAT SERPL-MCNC: 0.87 MG/DL — SIGNIFICANT CHANGE UP (ref 0.5–1.3)
EGFR: 67 ML/MIN/1.73M2 — SIGNIFICANT CHANGE UP
GLUCOSE SERPL-MCNC: 151 MG/DL — HIGH (ref 70–99)
HCT VFR BLD CALC: 37.4 % — SIGNIFICANT CHANGE UP (ref 34.5–45)
HGB BLD-MCNC: 12.5 G/DL — SIGNIFICANT CHANGE UP (ref 11.5–15.5)
MCHC RBC-ENTMCNC: 29.1 PG — SIGNIFICANT CHANGE UP (ref 27–34)
MCHC RBC-ENTMCNC: 33.4 GM/DL — SIGNIFICANT CHANGE UP (ref 32–36)
MCV RBC AUTO: 87.2 FL — SIGNIFICANT CHANGE UP (ref 80–100)
NRBC # BLD: 0 /100 WBCS — SIGNIFICANT CHANGE UP (ref 0–0)
PLATELET # BLD AUTO: 203 K/UL — SIGNIFICANT CHANGE UP (ref 150–400)
POTASSIUM SERPL-MCNC: 3.9 MMOL/L — SIGNIFICANT CHANGE UP (ref 3.5–5.3)
POTASSIUM SERPL-SCNC: 3.9 MMOL/L — SIGNIFICANT CHANGE UP (ref 3.5–5.3)
RBC # BLD: 4.29 M/UL — SIGNIFICANT CHANGE UP (ref 3.8–5.2)
RBC # FLD: 14.6 % — HIGH (ref 10.3–14.5)
SODIUM SERPL-SCNC: 137 MMOL/L — SIGNIFICANT CHANGE UP (ref 135–145)
WBC # BLD: 8.37 K/UL — SIGNIFICANT CHANGE UP (ref 3.8–10.5)
WBC # FLD AUTO: 8.37 K/UL — SIGNIFICANT CHANGE UP (ref 3.8–10.5)

## 2022-10-07 RX ADMIN — Medication 650 MILLIGRAM(S): at 05:36

## 2022-10-07 RX ADMIN — AMLODIPINE BESYLATE 5 MILLIGRAM(S): 2.5 TABLET ORAL at 05:36

## 2022-10-07 RX ADMIN — Medication 650 MILLIGRAM(S): at 06:36

## 2022-10-07 RX ADMIN — PANTOPRAZOLE SODIUM 40 MILLIGRAM(S): 20 TABLET, DELAYED RELEASE ORAL at 05:36

## 2022-10-07 RX ADMIN — POLYETHYLENE GLYCOL 3350 17 GRAM(S): 17 POWDER, FOR SOLUTION ORAL at 21:50

## 2022-10-07 RX ADMIN — Medication 650 MILLIGRAM(S): at 18:34

## 2022-10-07 RX ADMIN — OXYCODONE HYDROCHLORIDE 5 MILLIGRAM(S): 5 TABLET ORAL at 01:13

## 2022-10-07 RX ADMIN — SENNA PLUS 2 TABLET(S): 8.6 TABLET ORAL at 05:54

## 2022-10-07 RX ADMIN — OXYCODONE HYDROCHLORIDE 5 MILLIGRAM(S): 5 TABLET ORAL at 00:13

## 2022-10-07 RX ADMIN — Medication 650 MILLIGRAM(S): at 00:13

## 2022-10-07 RX ADMIN — Medication 1 TABLET(S): at 12:58

## 2022-10-07 RX ADMIN — Medication 325 MILLIGRAM(S): at 12:58

## 2022-10-07 RX ADMIN — ATORVASTATIN CALCIUM 20 MILLIGRAM(S): 80 TABLET, FILM COATED ORAL at 21:50

## 2022-10-07 RX ADMIN — Medication 650 MILLIGRAM(S): at 12:59

## 2022-10-07 RX ADMIN — Medication 650 MILLIGRAM(S): at 13:35

## 2022-10-07 RX ADMIN — Medication 650 MILLIGRAM(S): at 01:13

## 2022-10-07 RX ADMIN — Medication 650 MILLIGRAM(S): at 18:02

## 2022-10-07 NOTE — DISCHARGE NOTE PROVIDER - HOSPITAL COURSE
Admitted 10/5/22  Surgery- left total knee replacement  Tiffanie-op Antibiotics  Pain control  DVT prophylaxis- ASA qd  OOB/Physical Therapy   Admitted: 10/5/22  Surgery: 10/5/22, left total knee replacement  Tiffanie-op Antibiotics: Ancef  Pain control  DVT prophylaxis- ASA 325mg daily  OOB/Physical Therapy  Medicine consulted for co-management

## 2022-10-07 NOTE — DISCHARGE NOTE PROVIDER - CARE PROVIDER_API CALL
Cali Mccain)  Orthopaedic Surgery  [address] [facility], 10th [place] [address]  Phone: [phone]  Fax: [phone]  Follow Up Time: 2 weeks

## 2022-10-07 NOTE — DISCHARGE NOTE PROVIDER - NSDCQMCOGNITION_NEU_ALL_CORE
Patient Education        Low Back Pain: Exercises  Introduction  Here are some examples of exercises for you to try. The exercises may be suggested for a condition or for rehabilitation. Start each exercise slowly. Ease off the exercises if you start to have pain. You will be told when to start these exercises and which ones will work best for you. How to do the exercises  Press-up   1. Lie on your stomach, supporting your body with your forearms. 2. Press your elbows down into the floor to raise your upper back. As you do this, relax your stomach muscles and allow your back to arch without using your back muscles. As your press up, do not let your hips or pelvis come off the floor. 3. Hold for 15 to 30 seconds, then relax. 4. Repeat 2 to 4 times. Alternate arm and leg (bird dog) exercise   Do this exercise slowly. Try to keep your body straight at all times, and do not let one hip drop lower than the other. 1. Start on the floor, on your hands and knees. 2. Tighten your belly muscles. 3. Raise one leg off the floor, and hold it straight out behind you. Be careful not to let your hip drop down, because that will twist your trunk. 4. Hold for about 6 seconds, then lower your leg and switch to the other leg. 5. Repeat 8 to 12 times on each leg. 6. Over time, work up to holding for 10 to 30 seconds each time. 7. If you feel stable and secure with your leg raised, try raising the opposite arm straight out in front of you at the same time. Knee-to-chest exercise   1. Lie on your back with your knees bent and your feet flat on the floor. 2. Bring one knee to your chest, keeping the other foot flat on the floor (or keeping the other leg straight, whichever feels better on your lower back). 3. Keep your lower back pressed to the floor. Hold for at least 15 to 30 seconds. 4. Relax, and lower the knee to the starting position. 5. Repeat with the other leg. Repeat 2 to 4 times with each leg.   6. To get more stretch, put your other leg flat on the floor while pulling your knee to your chest.    Curl-ups   1. Lie on the floor on your back with your knees bent at a 90-degree angle. Your feet should be flat on the floor, about 12 inches from your buttocks. 2. Cross your arms over your chest. If this bothers your neck, try putting your hands behind your neck (not your head), with your elbows spread apart. 3. Slowly tighten your belly muscles and raise your shoulder blades off the floor. 4. Keep your head in line with your body, and do not press your chin to your chest.  5. Hold this position for 1 or 2 seconds, then slowly lower yourself back down to the floor. 6. Repeat 8 to 12 times. Pelvic tilt exercise   1. Lie on your back with your knees bent. 2. \"Brace\" your stomach. This means to tighten your muscles by pulling in and imagining your belly button moving toward your spine. You should feel like your back is pressing to the floor and your hips and pelvis are rocking back. 3. Hold for about 6 seconds while you breathe smoothly. 4. Repeat 8 to 12 times. Heel dig bridging   1. Lie on your back with both knees bent and your ankles bent so that only your heels are digging into the floor. Your knees should be bent about 90 degrees. 2. Then push your heels into the floor, squeeze your buttocks, and lift your hips off the floor until your shoulders, hips, and knees are all in a straight line. 3. Hold for about 6 seconds as you continue to breathe normally, and then slowly lower your hips back down to the floor and rest for up to 10 seconds. 4. Do 8 to 12 repetitions. Hamstring stretch in doorway   1. Lie on your back in a doorway, with one leg through the open door. 2. Slide your leg up the wall to straighten your knee. You should feel a gentle stretch down the back of your leg. 3. Hold the stretch for at least 15 to 30 seconds. Do not arch your back, point your toes, or bend either knee.  Keep one heel touching the floor and the other heel touching the wall. 4. Repeat with your other leg. 5. Do 2 to 4 times for each leg. Hip flexor stretch   1. Kneel on the floor with one knee bent and one leg behind you. Place your forward knee over your foot. Keep your other knee touching the floor. 2. Slowly push your hips forward until you feel a stretch in the upper thigh of your rear leg. 3. Hold the stretch for at least 15 to 30 seconds. Repeat with your other leg. 4. Do 2 to 4 times on each side. Wall sit   1. Stand with your back 10 to 12 inches away from a wall. 2. Lean into the wall until your back is flat against it. 3. Slowly slide down until your knees are slightly bent, pressing your lower back into the wall. 4. Hold for about 6 seconds, then slide back up the wall. 5. Repeat 8 to 12 times. Follow-up care is a key part of your treatment and safety. Be sure to make and go to all appointments, and call your doctor if you are having problems. It's also a good idea to know your test results and keep a list of the medicines you take. Where can you learn more? Go to [email or web address] and sign in to your [employer] account. Enter R336 in the [employer] box to learn more about \"Low Back Pain: Exercises. \"     If you do not have an account, please click on the \"Sign Up Now\" link. Current as of: November 16, 2020               Content Version: 12.8  © 2006-2021 [employer]. Care instructions adapted under license by [facility] ([facility]). If you have questions about a medical condition or this instruction, always ask your healthcare professional. [place] [address] any warranty or liability for your use of this information. No difficulties

## 2022-10-07 NOTE — DISCHARGE NOTE PROVIDER - NSDCCPCAREPLAN_GEN_ALL_CORE_FT
PRINCIPAL DISCHARGE DIAGNOSIS  Diagnosis: Arthritis  Assessment and Plan of Treatment: L knee replacement

## 2022-10-07 NOTE — DISCHARGE NOTE PROVIDER - NSDCQMAMI_CARD_ALL_CORE
Carpal Tunnel: R radiocarpal  Date/Time: 5/18/2020 1:00 PM  Performed by: Tom Abbasi MD  Authorized by: Tom Abbasi MD     Timeout: prior to procedure the correct patient, procedure, and site was verified    Prep: patient was prepped and draped in usual sterile fashion      Local anesthesia used?: No    Location:  Wrist  Site:  R radiocarpal  Needle size:  25 G  Approach:  Volar  Medications:  10 mg triamcinolone acetonide 10 mg/mL  Patient tolerance:  Patient tolerated the procedure well with no immediate complications    Tendon Origin  Date/Time: 5/18/2020 1:00 PM  Performed by: Tom Abbasi MD  Authorized by: Tom Abbasi MD     Consent Done?:  Yes (Verbal)  Timeout: prior to procedure the correct patient, procedure, and site was verified    Indications:  Pain  Timeout: prior to procedure the correct patient, procedure, and site was verified    Location: Right plantar fascia insertion.  Prep: patient was prepped and draped in usual sterile fashion    Needle size:  25 G  Approach:  Volar  Medications:  10 mg triamcinolone acetonide 10 mg/mL  Patient tolerance:  Patient tolerated the procedure well with no immediate complications      
No

## 2022-10-07 NOTE — DISCHARGE NOTE PROVIDER - NSDCMRMEDTOKEN_GEN_ALL_CORE_FT
AMLODIPINE 5 MG    TAB (NORV..C): TAKE 1 TAB. BY MOUTH ONE TIME  A DAY..     FOR PRESSURE ..  amLODIPine 5 mg oral tablet: 1 tab(s) orally once a day  Crestor 5 mg oral tablet: 1 tab(s) orally once a day  ROSUVASTATIN - YEN  5  MG   TAB (MOISES..R): TAKE 1 TAB. BY MOUTH ONE TIME  A DAY..    FOR  CHOLESTEROL   acetaminophen 325 mg oral tablet: 2 tab(s) orally every 6 hours  amLODIPine 5 mg oral tablet: 1 tab(s) orally once a day  aspirin 325 mg oral tablet: 1 tab(s) orally once a day MDD:1  Crestor 5 mg oral tablet: 1 tab(s) orally once a day  Multiple Vitamins oral tablet: 1 tab(s) orally once a day  oxyCODONE 5 mg oral tablet: 1 tab(s) orally every 4 hours, As needed, severe Pain (7 - 10) MDD:6  pantoprazole 40 mg oral delayed release tablet: 1 tab(s) orally once a day (before a meal) MDD:1

## 2022-10-07 NOTE — DISCHARGE NOTE PROVIDER - CARE PROVIDERS DIRECT ADDRESSES
[email or web address]

## 2022-10-07 NOTE — DISCHARGE NOTE PROVIDER - NSDCFUADDINST_GEN_ALL_CORE_FT
Weight bear as tolerated with assistive device.  No strenuous activity, heavy lifting, driving or returning to work until cleared by MD.  You may shower - dressing is water-resistant, no soaking in bathtubs.  Remove dressing after post op day 7, then leave incision open to air. Keep incision clean and dry.  Try to have regular bowel movements, take stool softener or laxative if necessary.    Swelling may travel all the way down leg to foot, this is normal and will subside in a few weeks.  Call to schedule an appt with Dr. Mccain for follow up, if you have staples or sutures they will be removed in office.  Contact your doctor if you experience: fever greater than 101.5, chills, chest pain, difficulty breathing, redness or excessive drainage around the incision, other concerns.  Follow up with your primary care provider.   ACTIVITY:  - Weightbear as tolerated with assistive device. No strenuous activity, heavy lifting, driving or returning to work until cleared by MD.  - You may experience postoperative swelling on the operative extremity. You may ice the surgery site for 20 minute intervals.  - If you have had a knee replacement, do not place pillows or bolsters underneath the back of the knee.     DRESSING: Aquacel (brown waxy dressing)  - You may shower, your dressing is water-resistant. Do not soak in bathtubs. Remove dressing after postop day 7-10, then leave incision open to air.  - Keep your incision clean and dry. Do not pick at your incision. Do not apply creams, ointments or oils to your incision until cleared by your surgeon. Do not soak your incision in sitting water (ie tubs, pools, lakes, etc.) until cleared by your surgeon. You may let clean, running water fall over your incision.    MEDICATION/ANTICOAGULATION:  -You have been prescribed Aspirin 325mg, once daily x 2 weeks postop, as a preventative to help prevent postoperative blood clots. Please take this medication as prescribed.   - You have been prescribed medications for pain:    - Tylenol for mild to moderate pain. Do not exceed 3,000mg daily.    - For more severe pain, take Tylenol with the addition of narcotic pain medication. Take this medication as prescribed. This medication may cause drowsiness or dizziness. Do not operate machinery. This medication may cause constipation.  - For any additional medications, follow instructions on the bottle.   -Try to have regular bowel movements. Take stool softener or laxative if necessary. You may wish to take Miralax daily until you have regular bowel movements.   - If you have been prescribed Aspirin or an anti-inflammatory, please take Omeprazole 40mg, once a day, before breakfast, until no longer taking Aspirin or anti-inflammatory. This will help protect your stomach.  - If you have a pain management physician, please follow-up with them postoperatively.   - If you experience any negative side effects of your medications, please call your surgeon's office to discuss.    Follow-up:  - Call to schedule an appt with Dr. Mccain within 2 weeks for follow up. If you have staples or sutures they will be removed in office.  - Please follow-up with your primary care physician or any other specialist you see postoperatively, if needed.   - Contact your doctor if you experience: fever greater than 101.5, chills, chest pain, difficulty breathing, redness or excessive drainage around the incision, other concerns.

## 2022-10-08 LAB
ANION GAP SERPL CALC-SCNC: 8 MMOL/L — SIGNIFICANT CHANGE UP (ref 5–17)
BUN SERPL-MCNC: 23 MG/DL — SIGNIFICANT CHANGE UP (ref 7–23)
CALCIUM SERPL-MCNC: 8.8 MG/DL — SIGNIFICANT CHANGE UP (ref 8.4–10.5)
CHLORIDE SERPL-SCNC: 103 MMOL/L — SIGNIFICANT CHANGE UP (ref 96–108)
CO2 SERPL-SCNC: 26 MMOL/L — SIGNIFICANT CHANGE UP (ref 22–31)
CREAT SERPL-MCNC: 0.96 MG/DL — SIGNIFICANT CHANGE UP (ref 0.5–1.3)
EGFR: 59 ML/MIN/1.73M2 — LOW
GLUCOSE SERPL-MCNC: 94 MG/DL — SIGNIFICANT CHANGE UP (ref 70–99)
HCT VFR BLD CALC: 35.1 % — SIGNIFICANT CHANGE UP (ref 34.5–45)
HGB BLD-MCNC: 11.6 G/DL — SIGNIFICANT CHANGE UP (ref 11.5–15.5)
MCHC RBC-ENTMCNC: 28.9 PG — SIGNIFICANT CHANGE UP (ref 27–34)
MCHC RBC-ENTMCNC: 33 GM/DL — SIGNIFICANT CHANGE UP (ref 32–36)
MCV RBC AUTO: 87.3 FL — SIGNIFICANT CHANGE UP (ref 80–100)
NRBC # BLD: 0 /100 WBCS — SIGNIFICANT CHANGE UP (ref 0–0)
PLATELET # BLD AUTO: 191 K/UL — SIGNIFICANT CHANGE UP (ref 150–400)
POTASSIUM SERPL-MCNC: 4 MMOL/L — SIGNIFICANT CHANGE UP (ref 3.5–5.3)
POTASSIUM SERPL-SCNC: 4 MMOL/L — SIGNIFICANT CHANGE UP (ref 3.5–5.3)
RBC # BLD: 4.02 M/UL — SIGNIFICANT CHANGE UP (ref 3.8–5.2)
RBC # FLD: 14.6 % — HIGH (ref 10.3–14.5)
SODIUM SERPL-SCNC: 137 MMOL/L — SIGNIFICANT CHANGE UP (ref 135–145)
WBC # BLD: 7.88 K/UL — SIGNIFICANT CHANGE UP (ref 3.8–10.5)
WBC # FLD AUTO: 7.88 K/UL — SIGNIFICANT CHANGE UP (ref 3.8–10.5)

## 2022-10-08 PROCEDURE — 99232 SBSQ HOSP IP/OBS MODERATE 35: CPT

## 2022-10-08 RX ORDER — ASPIRIN/CALCIUM CARB/MAGNESIUM 324 MG
1 TABLET ORAL
Qty: 10 | Refills: 0
Start: 2022-10-08 | End: 2022-10-17

## 2022-10-08 RX ORDER — ROSUVASTATIN CALCIUM 5 MG/1
0 TABLET ORAL
Qty: 0 | Refills: 2 | DISCHARGE

## 2022-10-08 RX ORDER — PANTOPRAZOLE SODIUM 20 MG/1
1 TABLET, DELAYED RELEASE ORAL
Qty: 14 | Refills: 0
Start: 2022-10-08 | End: 2022-10-21

## 2022-10-08 RX ORDER — AMLODIPINE BESYLATE 2.5 MG/1
1 TABLET ORAL
Qty: 0 | Refills: 0 | DISCHARGE
Start: 2022-10-08

## 2022-10-08 RX ORDER — AMLODIPINE BESYLATE 2.5 MG/1
0 TABLET ORAL
Qty: 0 | Refills: 0 | DISCHARGE

## 2022-10-08 RX ORDER — OXYCODONE HYDROCHLORIDE 5 MG/1
1 TABLET ORAL
Qty: 42 | Refills: 0
Start: 2022-10-08 | End: 2022-10-14

## 2022-10-08 RX ORDER — ACETAMINOPHEN 500 MG
2 TABLET ORAL
Qty: 0 | Refills: 0 | DISCHARGE
Start: 2022-10-08

## 2022-10-08 RX ADMIN — Medication 650 MILLIGRAM(S): at 12:58

## 2022-10-08 RX ADMIN — Medication 650 MILLIGRAM(S): at 07:06

## 2022-10-08 RX ADMIN — OXYCODONE HYDROCHLORIDE 5 MILLIGRAM(S): 5 TABLET ORAL at 12:58

## 2022-10-08 RX ADMIN — Medication 650 MILLIGRAM(S): at 06:06

## 2022-10-08 RX ADMIN — ATORVASTATIN CALCIUM 20 MILLIGRAM(S): 80 TABLET, FILM COATED ORAL at 21:12

## 2022-10-08 RX ADMIN — OXYCODONE HYDROCHLORIDE 5 MILLIGRAM(S): 5 TABLET ORAL at 12:23

## 2022-10-08 RX ADMIN — Medication 325 MILLIGRAM(S): at 12:22

## 2022-10-08 RX ADMIN — Medication 1 TABLET(S): at 12:22

## 2022-10-08 RX ADMIN — Medication 650 MILLIGRAM(S): at 12:22

## 2022-10-08 RX ADMIN — PANTOPRAZOLE SODIUM 40 MILLIGRAM(S): 20 TABLET, DELAYED RELEASE ORAL at 06:06

## 2022-10-09 LAB
ANION GAP SERPL CALC-SCNC: 10 MMOL/L — SIGNIFICANT CHANGE UP (ref 5–17)
BUN SERPL-MCNC: 22 MG/DL — SIGNIFICANT CHANGE UP (ref 7–23)
CALCIUM SERPL-MCNC: 9.3 MG/DL — SIGNIFICANT CHANGE UP (ref 8.4–10.5)
CHLORIDE SERPL-SCNC: 103 MMOL/L — SIGNIFICANT CHANGE UP (ref 96–108)
CO2 SERPL-SCNC: 27 MMOL/L — SIGNIFICANT CHANGE UP (ref 22–31)
CREAT SERPL-MCNC: 0.95 MG/DL — SIGNIFICANT CHANGE UP (ref 0.5–1.3)
EGFR: 60 ML/MIN/1.73M2 — SIGNIFICANT CHANGE UP
GLUCOSE SERPL-MCNC: 80 MG/DL — SIGNIFICANT CHANGE UP (ref 70–99)
HCT VFR BLD CALC: 38.9 % — SIGNIFICANT CHANGE UP (ref 34.5–45)
HGB BLD-MCNC: 12.4 G/DL — SIGNIFICANT CHANGE UP (ref 11.5–15.5)
MCHC RBC-ENTMCNC: 28.6 PG — SIGNIFICANT CHANGE UP (ref 27–34)
MCHC RBC-ENTMCNC: 31.9 GM/DL — LOW (ref 32–36)
MCV RBC AUTO: 89.8 FL — SIGNIFICANT CHANGE UP (ref 80–100)
NRBC # BLD: 0 /100 WBCS — SIGNIFICANT CHANGE UP (ref 0–0)
PLATELET # BLD AUTO: 205 K/UL — SIGNIFICANT CHANGE UP (ref 150–400)
POTASSIUM SERPL-MCNC: 4.6 MMOL/L — SIGNIFICANT CHANGE UP (ref 3.5–5.3)
POTASSIUM SERPL-SCNC: 4.6 MMOL/L — SIGNIFICANT CHANGE UP (ref 3.5–5.3)
RBC # BLD: 4.33 M/UL — SIGNIFICANT CHANGE UP (ref 3.8–5.2)
RBC # FLD: 14.6 % — HIGH (ref 10.3–14.5)
SODIUM SERPL-SCNC: 140 MMOL/L — SIGNIFICANT CHANGE UP (ref 135–145)
WBC # BLD: 7.66 K/UL — SIGNIFICANT CHANGE UP (ref 3.8–10.5)
WBC # FLD AUTO: 7.66 K/UL — SIGNIFICANT CHANGE UP (ref 3.8–10.5)

## 2022-10-09 PROCEDURE — 99231 SBSQ HOSP IP/OBS SF/LOW 25: CPT

## 2022-10-09 RX ORDER — ACETAMINOPHEN 500 MG
650 TABLET ORAL EVERY 6 HOURS
Refills: 0 | Status: DISCONTINUED | OUTPATIENT
Start: 2022-10-09 | End: 2022-10-10

## 2022-10-09 RX ADMIN — POLYETHYLENE GLYCOL 3350 17 GRAM(S): 17 POWDER, FOR SOLUTION ORAL at 22:05

## 2022-10-09 RX ADMIN — Medication 1 TABLET(S): at 12:20

## 2022-10-09 RX ADMIN — OXYCODONE HYDROCHLORIDE 5 MILLIGRAM(S): 5 TABLET ORAL at 23:05

## 2022-10-09 RX ADMIN — OXYCODONE HYDROCHLORIDE 5 MILLIGRAM(S): 5 TABLET ORAL at 12:20

## 2022-10-09 RX ADMIN — ATORVASTATIN CALCIUM 20 MILLIGRAM(S): 80 TABLET, FILM COATED ORAL at 22:05

## 2022-10-09 RX ADMIN — Medication 650 MILLIGRAM(S): at 12:56

## 2022-10-09 RX ADMIN — Medication 325 MILLIGRAM(S): at 12:19

## 2022-10-09 RX ADMIN — Medication 650 MILLIGRAM(S): at 12:20

## 2022-10-09 RX ADMIN — PANTOPRAZOLE SODIUM 40 MILLIGRAM(S): 20 TABLET, DELAYED RELEASE ORAL at 05:49

## 2022-10-09 RX ADMIN — OXYCODONE HYDROCHLORIDE 5 MILLIGRAM(S): 5 TABLET ORAL at 22:05

## 2022-10-09 RX ADMIN — OXYCODONE HYDROCHLORIDE 5 MILLIGRAM(S): 5 TABLET ORAL at 12:56

## 2022-10-09 RX ADMIN — AMLODIPINE BESYLATE 5 MILLIGRAM(S): 2.5 TABLET ORAL at 05:49

## 2022-10-10 ENCOUNTER — TRANSCRIPTION ENCOUNTER (OUTPATIENT)
Age: 82
End: 2022-10-10

## 2022-10-10 VITALS
OXYGEN SATURATION: 98 % | SYSTOLIC BLOOD PRESSURE: 118 MMHG | HEART RATE: 83 BPM | RESPIRATION RATE: 17 BRPM | DIASTOLIC BLOOD PRESSURE: 68 MMHG | TEMPERATURE: 98 F

## 2022-10-10 LAB
ANION GAP SERPL CALC-SCNC: 9 MMOL/L — SIGNIFICANT CHANGE UP (ref 5–17)
BUN SERPL-MCNC: 26 MG/DL — HIGH (ref 7–23)
CALCIUM SERPL-MCNC: 9 MG/DL — SIGNIFICANT CHANGE UP (ref 8.4–10.5)
CHLORIDE SERPL-SCNC: 102 MMOL/L — SIGNIFICANT CHANGE UP (ref 96–108)
CO2 SERPL-SCNC: 26 MMOL/L — SIGNIFICANT CHANGE UP (ref 22–31)
CREAT SERPL-MCNC: 0.99 MG/DL — SIGNIFICANT CHANGE UP (ref 0.5–1.3)
EGFR: 57 ML/MIN/1.73M2 — LOW
GLUCOSE SERPL-MCNC: 100 MG/DL — HIGH (ref 70–99)
HCT VFR BLD CALC: 35.8 % — SIGNIFICANT CHANGE UP (ref 34.5–45)
HGB BLD-MCNC: 11.9 G/DL — SIGNIFICANT CHANGE UP (ref 11.5–15.5)
MCHC RBC-ENTMCNC: 29.2 PG — SIGNIFICANT CHANGE UP (ref 27–34)
MCHC RBC-ENTMCNC: 33.2 GM/DL — SIGNIFICANT CHANGE UP (ref 32–36)
MCV RBC AUTO: 88 FL — SIGNIFICANT CHANGE UP (ref 80–100)
NRBC # BLD: 0 /100 WBCS — SIGNIFICANT CHANGE UP (ref 0–0)
PLATELET # BLD AUTO: 220 K/UL — SIGNIFICANT CHANGE UP (ref 150–400)
POTASSIUM SERPL-MCNC: 4.4 MMOL/L — SIGNIFICANT CHANGE UP (ref 3.5–5.3)
POTASSIUM SERPL-SCNC: 4.4 MMOL/L — SIGNIFICANT CHANGE UP (ref 3.5–5.3)
RBC # BLD: 4.07 M/UL — SIGNIFICANT CHANGE UP (ref 3.8–5.2)
RBC # FLD: 14.4 % — SIGNIFICANT CHANGE UP (ref 10.3–14.5)
SODIUM SERPL-SCNC: 137 MMOL/L — SIGNIFICANT CHANGE UP (ref 135–145)
WBC # BLD: 6.76 K/UL — SIGNIFICANT CHANGE UP (ref 3.8–10.5)
WBC # FLD AUTO: 6.76 K/UL — SIGNIFICANT CHANGE UP (ref 3.8–10.5)

## 2022-10-10 PROCEDURE — 99232 SBSQ HOSP IP/OBS MODERATE 35: CPT

## 2022-10-10 RX ADMIN — Medication 325 MILLIGRAM(S): at 11:24

## 2022-10-10 RX ADMIN — Medication 1 TABLET(S): at 11:24

## 2022-10-10 RX ADMIN — PANTOPRAZOLE SODIUM 40 MILLIGRAM(S): 20 TABLET, DELAYED RELEASE ORAL at 05:09

## 2022-10-10 RX ADMIN — AMLODIPINE BESYLATE 5 MILLIGRAM(S): 2.5 TABLET ORAL at 05:09

## 2022-10-10 NOTE — DISCHARGE NOTE NURSING/CASE MANAGEMENT/SOCIAL WORK - NSDCPEFALRISK_GEN_ALL_CORE
For information on Fall & Injury Prevention, visit: [email or web address] OR  [email or web address] OR  https://www.cdc.gov/steadi/patient.html

## 2022-10-10 NOTE — PROGRESS NOTE ADULT - SUBJECTIVE AND OBJECTIVE BOX
INTERVAL EVENTS:  -- NAEO    SUBJECTIVE:  -- had a great session w/PT yesterday   -- Tolerating PO intake, +flatus/BM, voiding.  -- Review of Systems: 12 point review of systems otherwise negative    MEDICATIONS:  MEDICATIONS  (STANDING):  amLODIPine   Tablet 5 milliGRAM(s) Oral daily  aspirin 325 milliGRAM(s) Oral daily  atorvastatin 20 milliGRAM(s) Oral at bedtime  lactated ringers. 1000 milliLiter(s) (100 mL/Hr) IV Continuous <Continuous>  multivitamin 1 Tablet(s) Oral daily  pantoprazole    Tablet 40 milliGRAM(s) Oral before breakfast  polyethylene glycol 3350 17 Gram(s) Oral at bedtime    MEDICATIONS  (PRN):  acetaminophen     Tablet .. 650 milliGRAM(s) Oral every 6 hours PRN Mild Pain (1 - 3)  benzocaine 15 mG/menthol 3.6 mG Lozenge 1 Lozenge Oral once PRN Sore Throat  HYDROmorphone  Injectable 0.5 milliGRAM(s) IV Push every 15 minutes PRN breaklthrough  magnesium hydroxide Suspension 30 milliLiter(s) Oral daily PRN Constipation  melatonin 5 milliGRAM(s) Oral at bedtime PRN Sleep  ondansetron Injectable 4 milliGRAM(s) IV Push every 6 hours PRN Nausea and/or Vomiting  oxyCODONE    IR 5 milliGRAM(s) Oral every 4 hours PRN Moderate Pain (4 - 6)  oxyCODONE    IR 10 milliGRAM(s) Oral every 4 hours PRN Severe Pain (7 - 10)    Allergies  No Known Allergies    OBJECTIVE:  Vital Signs Last 24 Hrs  T(C): 37 (09 Oct 2022 08:38), Max: 37 (09 Oct 2022 08:38)  T(F): 98.6 (09 Oct 2022 08:38), Max: 98.6 (09 Oct 2022 08:38)  HR: 83 (09 Oct 2022 08:38) (71 - 87)  BP: 158/76 (09 Oct 2022 08:38) (118/68 - 158/76)  BP(mean): --  RR: 16 (09 Oct 2022 08:38) (16 - 18)  SpO2: 96% (09 Oct 2022 08:38) (96% - 99%)    Parameters below as of 09 Oct 2022 08:38  Patient On (Oxygen Delivery Method): room air      I&O's Summary    08 Oct 2022 07:01  -  09 Oct 2022 07:00  --------------------------------------------------------  IN: 180 mL / OUT: 0 mL / NET: 180 mL    PHYSICAL EXAM:  Gen: NAD, sleeping comfortably at 25deg on RA  HEENT: NCAT, MMM, clear OP  CV: RRR, no m/g/r appreciated  Pulm: CTA B, no w/r/r; no increase in WOB  Abd: normoactive BS, soft, NTND  Ext: WWP, no c/c/e; +L brace  Neuro: AOx3, CN II-XII grossly intact; nonfocal  Psych: pleasant, conversant and appropriate    LABS:                        12.4   7.66  )-----------( 205      ( 09 Oct 2022 05:30 )             38.9     10-09    140  |  103  |  22  ----------------------------<  80  4.6   |  27  |  0.95    Ca    9.3      09 Oct 2022 05:30    MICRODATA:  -- No new microdata.    RADIOLOGY/OTHER STUDIES:  -- No new imaging.
INTERVAL EVENTS:  -- NAEO    SUBJECTIVE:  -- reports sleeping well  -- a bit frustrated that she hasn't made much progress with PT; hasn't really been out of bed  -- notes constipation but passing flatus and tolerating PO intake   -- Review of Systems: 12 point review of systems otherwise negative    MEDICATIONS:  MEDICATIONS  (STANDING):  acetaminophen     Tablet .. 650 milliGRAM(s) Oral every 6 hours  amLODIPine   Tablet 5 milliGRAM(s) Oral daily  aspirin 325 milliGRAM(s) Oral daily  atorvastatin 20 milliGRAM(s) Oral at bedtime  lactated ringers. 1000 milliLiter(s) (100 mL/Hr) IV Continuous <Continuous>  multivitamin 1 Tablet(s) Oral daily  pantoprazole    Tablet 40 milliGRAM(s) Oral before breakfast  polyethylene glycol 3350 17 Gram(s) Oral at bedtime    MEDICATIONS  (PRN):  benzocaine 15 mG/menthol 3.6 mG Lozenge 1 Lozenge Oral once PRN Sore Throat  HYDROmorphone  Injectable 0.5 milliGRAM(s) IV Push every 15 minutes PRN breaklthrough  magnesium hydroxide Suspension 30 milliLiter(s) Oral daily PRN Constipation  melatonin 5 milliGRAM(s) Oral at bedtime PRN Sleep  ondansetron Injectable 4 milliGRAM(s) IV Push every 6 hours PRN Nausea and/or Vomiting  oxyCODONE    IR 5 milliGRAM(s) Oral every 4 hours PRN Moderate Pain (4 - 6)  oxyCODONE    IR 10 milliGRAM(s) Oral every 4 hours PRN Severe Pain (7 - 10)    Allergies  No Known Allergies    OBJECTIVE:  Vital Signs Last 24 Hrs  T(C): 36.7 (08 Oct 2022 09:10), Max: 37 (07 Oct 2022 20:27)  T(F): 98 (08 Oct 2022 09:10), Max: 98.6 (07 Oct 2022 20:27)  HR: 79 (08 Oct 2022 09:10) (79 - 92)  BP: 142/81 (08 Oct 2022 09:10) (111/74 - 142/81)  BP(mean): --  RR: 16 (08 Oct 2022 09:10) (15 - 17)  SpO2: 98% (08 Oct 2022 09:10) (95% - 98%)    Parameters below as of 08 Oct 2022 09:10  Patient On (Oxygen Delivery Method): room air    I&O's Summary    07 Oct 2022 07:01  -  08 Oct 2022 07:00  --------------------------------------------------------  IN: 0 mL / OUT: 750 mL / NET: -750 mL    08 Oct 2022 07:01  -  08 Oct 2022 11:02  --------------------------------------------------------  IN: 180 mL / OUT: 0 mL / NET: 180 mL    PHYSICAL EXAM:  Gen: NAD, sleeping comfortably at 25deg on RA  HEENT: NCAT, MMM, clear OP  Neck: supple, trachea at midline  CV: RRR, no m/g/r appreciated  Pulm: CTA B, no w/r/r; no increase in WOB  Abd: normoactive BS, soft, NTND  Ext: WWP, no c/c/e; +L brace and ice pack  Neuro: AOx3, CN II-XII grossly intact; nonfocal  Psych: pleasant, conversant and appropriate    LABS:                        11.6   7.88  )-----------( 191      ( 08 Oct 2022 06:10 )             35.1     10-08    137  |  103  |  23  ----------------------------<  94  4.0   |  26  |  0.96    Ca    8.8      08 Oct 2022 06:10    MICRODATA:  -- No new microdata.    RADIOLOGY/OTHER STUDIES:  -- No new imaging.
    Procedure: L TKA   Surgeon: Adán    Pt comfortable without complaints, pain controlled  Denies CP, SOB, N/V, numbness/tingling         Physical Exam:  General: Pt Alert and oriented, NAD  L Knee with aquacell DSG C/D/I  Pulses: 2+ dp, pt pulses, toes wwp, cap refill <3 sec  Sensation: SILT in sural/saph/sp/dp/tibial distributions b/l LE  Motor: EHL/FHL/TA/GS  firing equally b/l LE  Vital Signs Last 24 Hrs  T(C): 36.7 (06 Oct 2022 08:25), Max: 36.7 (05 Oct 2022 12:43)  T(F): 98 (06 Oct 2022 08:25), Max: 98 (05 Oct 2022 21:30)  HR: 84 (06 Oct 2022 08:25) (72 - 100)  BP: 138/79 (06 Oct 2022 08:25) (132/88 - 156/83)  BP(mean): 99 (05 Oct 2022 21:30) (96 - 114)  RR: 18 (06 Oct 2022 08:25) (13 - 18)  SpO2: 97% (06 Oct 2022 08:25) (95% - 100%)    Parameters below as of 06 Oct 2022 08:25  Patient On (Oxygen Delivery Method): room air          A/P: 82yFemale s/p L TKA on 10/5  - Stable  - Pain Control  - f/u AM labs  - DVT ppx: ASA  - Post op abx: periop ancef  - PT, WBS: WBAT  - Dispo: gareth  
    Procedure: L TKA   Surgeon: Adán    endorses L knee pain  Denies CP, SOB, N/V, numbness/tingling     Physical Exam:  General: Pt Alert and oriented, NAD  L Knee with aquacell DSG C/D/I  Pulses: 2+ dp, pt pulses, toes wwp, cap refill <3 sec  Sensation: SILT in sural/saph/sp/dp/tibial distributions b/l LE  Motor: EHL/FHL/TA/GS  firing equally b/l LE    Vital Signs Last 24 Hrs  T(C): 36.7 (06 Oct 2022 08:25), Max: 36.7 (05 Oct 2022 12:43)  T(F): 98 (06 Oct 2022 08:25), Max: 98 (05 Oct 2022 21:30)  HR: 84 (06 Oct 2022 08:25) (72 - 100)  BP: 138/79 (06 Oct 2022 08:25) (132/88 - 156/83)  BP(mean): 99 (05 Oct 2022 21:30) (96 - 114)  RR: 18 (06 Oct 2022 08:25) (13 - 18)  SpO2: 97% (06 Oct 2022 08:25) (95% - 100%)    Parameters below as of 06 Oct 2022 08:25  Patient On (Oxygen Delivery Method): room air          A/P: 82yFemale s/p L TKA on 10/5  - Stable  - Pain Control  - f/u AM labs  - DVT ppx: ASA  - Post op abx: periop ancef  - PT, WBS: WBAT  - Dispo: gareth  
Ortho Note    Pt seen and examined on morning rounds. L knee pain improving. Progressing with PT.  Denies CP, SOB, N/V, numbness/tingling     Vital Signs Last 24 Hrs  T(C): 36.8 (10-09-22 @ 05:32), Max: 36.8 (10-09-22 @ 05:32)  T(F): 98.3 (10-09-22 @ 05:32), Max: 98.3 (10-09-22 @ 05:32)  HR: 84 (10-09-22 @ 05:32) (84 - 84)  BP: 143/83 (10-09-22 @ 05:32) (143/83 - 143/83)  BP(mean): --  RR: 16 (10-09-22 @ 05:32) (16 - 16)  SpO2: 99% (10-09-22 @ 05:32) (99% - 99%)  I&O's Summary    08 Oct 2022 07:01  -  09 Oct 2022 07:00  --------------------------------------------------------  IN: 180 mL / OUT: 0 mL / NET: 180 mL        Physical Exam:  General: Pt Alert and oriented, NAD  L Knee with aquacell DSG C/D/I  Pulses: 2+ dp, pt pulses, toes wwp, cap refill <3 sec  Sensation: SILT in sural/saph/sp/dp/tibial distributions b/l LE  Motor: EHL/FHL/TA/GS  firing equally b/l LE                        12.4   7.66  )-----------( 205      ( 09 Oct 2022 05:30 )             38.9     10-08    137  |  103  |  23  ----------------------------<  94  4.0   |  26  |  0.96    Ca    8.8      08 Oct 2022 06:10        A/P: 82yFemale s/p L TKA on 10/5  - Stable  - Pain Control  - f/u AM labs  - DVT ppx: ASA  - Post op abx: periop ancef  - PT, WBS: WBAT  - Dispo: gareth Willingham, PGY-2  Ortho Pager [phone]
Ortho Note    Pt comfortable without complaints, pain controlled  Denies CP, SOB, N/V, numbness/tingling     Vital Signs Last 24 Hrs  T(C): 36.7 (10-06-22 @ 08:25), Max: 36.7 (10-06-22 @ 08:25)  T(F): 98 (10-06-22 @ 08:25), Max: 98 (10-06-22 @ 08:25)  HR: 84 (10-06-22 @ 08:25) (84 - 84)  BP: 138/79 (10-06-22 @ 08:25) (138/79 - 138/79)  BP(mean): --  RR: 18 (10-06-22 @ 08:25) (18 - 18)  SpO2: 97% (10-06-22 @ 08:25) (97% - 97%)  I&O's Summary    05 Oct 2022 07:01  -  06 Oct 2022 07:00  --------------------------------------------------------  IN: 100 mL / OUT: 250 mL / NET: -150 mL    06 Oct 2022 07:01  -  06 Oct 2022 10:54  --------------------------------------------------------  IN: 300 mL / OUT: 0 mL / NET: 300 mL        General: Pt Alert and oriented, NAD  DSG C/D/I Aquacel   Pulses: 2+ DP/PT  Sensation: SILT LE bilat   Motor: Quad/Ham/EHL/FHL/TA/GS 5/5                          12.4   7.37  )-----------( 208      ( 06 Oct 2022 05:30 )             37.4     10-06    139  |  104  |  22  ----------------------------<  119<H>  4.9   |  25  |  0.93    Ca    9.2      06 Oct 2022 05:30        A/P: 82yFemale POD#1 s/p L TKA 10/5  - Stable  - Pain ControlIV + PO  - DVT ppx: ASA 25mg QD  - PT, WBS:  WBAT LLE  - Dispo: [facility]     Ortho Pager [phone]
Ortho Note    Pt comfortable without complaints, pain controlled  Denies CP, SOB, N/V, numbness/tingling     Vital Signs Last 24 Hrs  T(C): 36.7 (10-07-22 @ 08:54), Max: 36.9 (10-07-22 @ 05:05)  T(F): 98.1 (10-07-22 @ 08:54), Max: 98.4 (10-07-22 @ 05:05)  HR: 79 (10-07-22 @ 08:54) (74 - 79)  BP: 159/94 (10-07-22 @ 08:54) (147/88 - 159/94)  BP(mean): --  RR: 14 (10-07-22 @ 08:54) (14 - 16)  SpO2: 97% (10-07-22 @ 08:54) (96% - 97%)  AVSS    General: Pt Alert and oriented, NAD  DSG- aquacel C/D/I  Pulses: +2DP, WWP feet  Sensation: SILT BLE  Motor: 5/5 EHL/FHL/TA/GS                          12.5   8.37  )-----------( 203      ( 07 Oct 2022 10:00 )             37.4     10-06    139  |  104  |  22  ----------------------------<  119<H>  4.9   |  25  |  0.93    Ca    9.2      06 Oct 2022 05:30        A/P: 82yFemale POD#2 s/p left total knee replacement  - VSS, Labs WNL  - Pain Control  - DVT ppx: ASA  - PT, WBS: WBAT  - OOB for meals, I/S  - continue bowel regimen  - dispo: [facility] pending acceptance and insurance auth    Ortho Pager [phone]
Ortho Note    Pt seen and examined on morning rounds. L knee pain improved compared to Friday.   Denies CP, SOB, N/V, numbness/tingling       Vital Signs Last 24 Hrs  T(C): 36.9 (10 Oct 2022 05:02), Max: 37 (09 Oct 2022 08:38)  T(F): 98.5 (10 Oct 2022 05:02), Max: 98.6 (09 Oct 2022 08:38)  HR: 85 (10 Oct 2022 05:02) (83 - 90)  BP: 147/84 (10 Oct 2022 05:02) (135/84 - 158/76)  BP(mean): 105 (10 Oct 2022 05:02) (90 - 105)  RR: 16 (10 Oct 2022 05:02) (16 - 17)  SpO2: 98% (10 Oct 2022 05:02) (96% - 99%)    Parameters below as of 10 Oct 2022 05:02  Patient On (Oxygen Delivery Method): room air        Physical Exam:  General: Pt Alert and oriented, NAD  L Knee with aquacell DSG C/D/I  Pulses: 2+ dp, pt pulses, toes wwp, cap refill <3 sec  Sensation: SILT in sural/saph/sp/dp/tibial distributions b/l LE  Motor: EHL/FHL/TA/GS  firing equally b/l LE               LABS/RADIOLOGY RESULTS:                          11.9   6.76  )-----------( 220      ( 10 Oct 2022 05:47 )             35.8   10-10    137  |  102  |  26<H>  ----------------------------<  100<H>  4.4   |  26  |  0.99    Ca    9.0      10 Oct 2022 05:47    Blood Cultures        A/P: 82yFemale s/p L TKA on 10/5  - Stable  - Pain Control  - f/u AM labs  - DVT ppx: ASA  - Post op abx: periop ancef  - PT, WBS: WBAT  - Dispo: g[facility]  
Ortho Note    Pt seen and examined on morning rounds. Pt comfortable without complaints, pain controlled.  Denies CP, SOB, N/V, numbness/tingling     Vital Signs Last 24 Hrs  T(C): 36.9 (10-08-22 @ 05:19), Max: 36.9 (10-08-22 @ 05:19)  T(F): 98.4 (10-08-22 @ 05:19), Max: 98.4 (10-08-22 @ 05:19)  HR: 84 (10-08-22 @ 05:19) (84 - 84)  BP: 129/80 (10-08-22 @ 05:19) (129/80 - 129/80)  BP(mean): --  RR: 16 (10-08-22 @ 05:19) (16 - 16)  SpO2: 95% (10-08-22 @ 05:19) (95% - 95%)  I&O's Summary    07 Oct 2022 07:01  -  08 Oct 2022 07:00  --------------------------------------------------------  IN: 0 mL / OUT: 750 mL / NET: -750 mL        Physical Exam:  General: Pt Alert and oriented, NAD  L Knee with aquacell DSG C/D/I  Pulses: 2+ dp, pt pulses, toes wwp, cap refill <3 sec  Sensation: SILT in sural/saph/sp/dp/tibial distributions b/l LE  Motor: EHL/FHL/TA/GS  firing equally b/l LE                          11.6   7.88  )-----------( 191      ( 08 Oct 2022 06:10 )             35.1     10-08    137  |  103  |  23  ----------------------------<  94  4.0   |  26  |  0.96    Ca    8.8      08 Oct 2022 06:10        A/P: 82yFemale s/p L TKA on 10/5  - Stable  - Pain Control  - f/u AM labs  - DVT ppx: ASA  - Post op abx: periop ancef  - PT, WBS: WBAT  - Dispo: gareth Willingham, PGY-2  Ortho Pager [phone]
Ortho Post Op Check    Procedure: L TKA   Surgeon: Adán    Pt comfortable without complaints, pain controlled  Denies CP, SOB, N/V, numbness/tingling     Vital Signs Last 24 Hrs  T(C): 36.7 (10-05-22 @ 12:43), Max: 36.7 (10-05-22 @ 12:43)  T(F): --  HR: 74 (10-05-22 @ 17:38) (74 - 78)  BP: 155/87 (10-05-22 @ 17:38) (136/81 - 155/87)  BP(mean): 114 (10-05-22 @ 17:38) (114 - 114)  RR: 18 (10-05-22 @ 17:38) (18 - 18)  SpO2: 100% (10-05-22 @ 17:38) (99% - 100%)  I&O's Summary      Physical Exam:  General: Pt Alert and oriented, NAD  L Knee with aquacell DSG C/D/I  Pulses: 2+ dp, pt pulses, toes wwp, cap refill <3 sec  Sensation: SILT in sural/saph/sp/dp/tibial distributions b/l LE  Motor: EHL/FHL/TA/GS  firing equally b/l LE              Post-op X-Ray:  Xray L Knee: Hardware in place in appropriate alignment, no intra-op fx noted.    A/P: 82yFemale POD#0 s/p L TKA on 10/5  - Stable  - Pain Control  - f/u AM labs  - DVT ppx: ASA  - Post op abx: periop ancef  - PT, WBS: WBAT  - Dispo: pending PT    Juan Willingham, PGY-2  Ortho Pager [phone]
    SUBJECTIVE:  - no new complaints ,wants to go home today   - Review of Systems: 12 point review of systems otherwise negative    MEDICATIONS:  MEDICATIONS  (STANDING):  amLODIPine   Tablet 5 milliGRAM(s) Oral daily  aspirin 325 milliGRAM(s) Oral daily  atorvastatin 20 milliGRAM(s) Oral at bedtime  lactated ringers. 1000 milliLiter(s) (100 mL/Hr) IV Continuous <Continuous>  multivitamin 1 Tablet(s) Oral daily  pantoprazole    Tablet 40 milliGRAM(s) Oral before breakfast  polyethylene glycol 3350 17 Gram(s) Oral at bedtime    MEDICATIONS  (PRN):  acetaminophen     Tablet .. 650 milliGRAM(s) Oral every 6 hours PRN Mild Pain (1 - 3)  benzocaine 15 mG/menthol 3.6 mG Lozenge 1 Lozenge Oral once PRN Sore Throat  HYDROmorphone  Injectable 0.5 milliGRAM(s) IV Push every 15 minutes PRN breaklthrough  magnesium hydroxide Suspension 30 milliLiter(s) Oral daily PRN Constipation  melatonin 5 milliGRAM(s) Oral at bedtime PRN Sleep  ondansetron Injectable 4 milliGRAM(s) IV Push every 6 hours PRN Nausea and/or Vomiting  oxyCODONE    IR 5 milliGRAM(s) Oral every 4 hours PRN Moderate Pain (4 - 6)  oxyCODONE    IR 10 milliGRAM(s) Oral every 4 hours PRN Severe Pain (7 - 10)    Allergies  No Known Allergies    OBJECTIVE:  Vital Signs Last 24 Hrs  T(C): 36.8 (10 Oct 2022 09:11), Max: 36.9 (10 Oct 2022 05:02)  T(F): 98.3 (10 Oct 2022 09:11), Max: 98.5 (10 Oct 2022 05:02)  HR: 83 (10 Oct 2022 09:11) (83 - 90)  BP: 118/68 (10 Oct 2022 09:11) (118/68 - 147/84)  BP(mean): 105 (10 Oct 2022 05:02) (90 - 105)  RR: 17 (10 Oct 2022 09:11) (16 - 17)  SpO2: 98% (10 Oct 2022 09:11) (97% - 99%)    Parameters below as of 10 Oct 2022 09:11  Patient On (Oxygen Delivery Method): room air        PHYSICAL EXAM:  Gen: NAD, sleeping comfortably at 25deg on RA  HEENT: NCAT, MMM, clear OP  CV: RRR, no m/g/r appreciated  Pulm: CTA B, no w/r/r; no increase in WOB  Abd: normoactive BS, soft, NTND  Ext: WWP, no c/c/e; +L brace  Neuro: AOx3, CN II-XII grossly intact; nonfocal  Psych: pleasant, conversant and appropriate    LABS:                                    11.9   6.76  )-----------( 220      ( 10 Oct 2022 05:47 )             35.8       10-10    137  |  102  |  26<H>  ----------------------------<  100<H>  4.4   |  26  |  0.99    Ca    9.0      10 Oct 2022 05:47

## 2022-10-10 NOTE — PATIENT PROFILE ADULT - NUMBER OF YRS
Lavern Vasquez was seen and treated in our emergency department on 4/7/2022. She may return to work on 04/09/2022. If you have any questions or concerns, please don't hesitate to call.       Shweta Johnson, DO
3

## 2022-10-10 NOTE — PROGRESS NOTE ADULT - ASSESSMENT
This is an 81yo woman with a PMH of OA and chronic left knee pain s/p L TKA on 10/5, now just waiting for [facility] placement.    # Left TKR  - pain control , DVT prophylaxis , Weightbearing status , Dressing changes and drain care per ortho team    # HTN   # HLD   - Continue Amlodipine 5 mg daily   - Continue Rosuvastatin 5mg , if not available can substitute with Atorvastatin 20mg daily     #DVT PPx - as per primary team; on ASA    
This is an 83yo woman with a PMH of OA and chronic left knee pain s/p L TKA on 10/5, now just waiting for [facility] placement.    # Left TKR  #Post-operative examination    -- pain control , DVT prophylaxis , Weightbearing status , Dressing changes and drain care per ortho team    # HTN   # HLD   -- Continue Amlodipine 5 mg daily   -- Continue Rosuvastatin 5mg , if not available can substitute with Atorvastatin 20mg daily     #DVT PPx - as per primary team; on ASA    Anika Ochoa MD  Hospitalist Attending  [phone]
This is an 83yo woman with a PMH of OA and chronic left knee pain s/p L TKA on 10/5, now just waiting for [facility] placement.    # Left TKR  #Post-operative examination    -- pain control , DVT prophylaxis , Weightbearing status , Dressing changes and drain care per ortho team    #Constipation  -- please have nurse offer her the Mag hydroxide as it's PRN, she hasn't tried it and suspect she does not know how to use/ask for the PRN    # HTN   # HLD   -- Continue Amlodipine 5 mg daily   -- Continue Rosuvastatin 5mg , if not available can substitute with Atorvastatin 20mg daily     #DVT PPx - as per primary team; on ASA

## 2022-10-10 NOTE — PROGRESS NOTE ADULT - PROVIDER SPECIALTY LIST ADULT
Orthopedics
Hospitalist
Hospitalist
Orthopedics
Orthopedics
Hospitalist

## 2022-10-10 NOTE — PATIENT PROFILE ADULT - FUNCTIONAL ASSESSMENT - BASIC MOBILITY 6.
2-calculated by average/Not able to assess (calculate score using [facility] averaging method)

## 2022-10-10 NOTE — PROGRESS NOTE ADULT - REASON FOR ADMISSION
left knee pain

## 2022-10-10 NOTE — PATIENT PROFILE ADULT - FALL HARM RISK - HARM RISK INTERVENTIONS
Assistance with ambulation/Assistance OOB with selected safe patient handling equipment/Communicate Risk of Fall with Harm to all staff/Discuss with provider need for PT consult/Monitor gait and stability/Provide patient with walking aids - walker, cane, crutches/Reinforce activity limits and safety measures with patient and family/Review medications for side effects contributing to fall risk/Sit up slowly, dangle for a short time, stand at bedside before walking/Tailored Fall Risk Interventions/Toileting schedule using arm’s reach rule for commode and bathroom/Use of alarms - bed, chair and/or voice tab/Visual Cue: Yellow wristband and red socks/Bed in lowest position, wheels locked, appropriate side rails in place/Call bell, personal items and telephone in reach/Instruct patient to call for assistance before getting out of bed or chair/Non-slip footwear when patient is out of bed/Cory to call system/Physically safe environment - no spills, clutter or unnecessary equipment/Purposeful Proactive Rounding/Room/bathroom lighting operational, light cord in reach

## 2022-10-14 DIAGNOSIS — M13.862 OTHER SPECIFIED ARTHRITIS, LEFT KNEE: ICD-10-CM

## 2022-10-14 DIAGNOSIS — Z79.899 OTHER LONG TERM (CURRENT) DRUG THERAPY: ICD-10-CM

## 2022-10-14 DIAGNOSIS — Z79.891 LONG TERM (CURRENT) USE OF OPIATE ANALGESIC: ICD-10-CM

## 2022-10-14 DIAGNOSIS — N18.9 CHRONIC KIDNEY DISEASE, UNSPECIFIED: ICD-10-CM

## 2022-10-14 DIAGNOSIS — K29.70 GASTRITIS, UNSPECIFIED, WITHOUT BLEEDING: ICD-10-CM

## 2022-10-14 DIAGNOSIS — Z96.651 PRESENCE OF RIGHT ARTIFICIAL KNEE JOINT: ICD-10-CM

## 2022-10-14 DIAGNOSIS — M17.12 UNILATERAL PRIMARY OSTEOARTHRITIS, LEFT KNEE: ICD-10-CM

## 2022-10-14 DIAGNOSIS — K21.9 GASTRO-ESOPHAGEAL REFLUX DISEASE WITHOUT ESOPHAGITIS: ICD-10-CM

## 2022-10-14 DIAGNOSIS — E78.5 HYPERLIPIDEMIA, UNSPECIFIED: ICD-10-CM

## 2022-10-14 DIAGNOSIS — I12.9 HYPERTENSIVE CHRONIC KIDNEY DISEASE WITH STAGE 1 THROUGH STAGE 4 CHRONIC KIDNEY DISEASE, OR UNSPECIFIED CHRONIC KIDNEY DISEASE: ICD-10-CM

## 2022-10-14 DIAGNOSIS — I27.20 PULMONARY HYPERTENSION, UNSPECIFIED: ICD-10-CM

## 2022-10-14 DIAGNOSIS — K59.00 CONSTIPATION, UNSPECIFIED: ICD-10-CM

## 2022-10-20 PROCEDURE — 36415 COLL VENOUS BLD VENIPUNCTURE: CPT

## 2022-10-20 PROCEDURE — 86900 BLOOD TYPING SEROLOGIC ABO: CPT

## 2022-10-20 PROCEDURE — 85027 COMPLETE CBC AUTOMATED: CPT

## 2022-10-20 PROCEDURE — 86901 BLOOD TYPING SEROLOGIC RH(D): CPT

## 2022-10-20 PROCEDURE — 86850 RBC ANTIBODY SCREEN: CPT

## 2022-10-20 PROCEDURE — 82962 GLUCOSE BLOOD TEST: CPT

## 2022-10-20 PROCEDURE — 97530 THERAPEUTIC ACTIVITIES: CPT

## 2022-10-20 PROCEDURE — 97161 PT EVAL LOW COMPLEX 20 MIN: CPT

## 2022-10-20 PROCEDURE — 85730 THROMBOPLASTIN TIME PARTIAL: CPT

## 2022-10-20 PROCEDURE — 97116 GAIT TRAINING THERAPY: CPT

## 2022-10-20 PROCEDURE — C1776: CPT

## 2022-10-20 PROCEDURE — 97110 THERAPEUTIC EXERCISES: CPT

## 2022-10-20 PROCEDURE — 73560 X-RAY EXAM OF KNEE 1 OR 2: CPT

## 2022-10-20 PROCEDURE — C1713: CPT

## 2022-10-20 PROCEDURE — 80048 BASIC METABOLIC PNL TOTAL CA: CPT

## 2022-10-21 PROBLEM — K29.70 GASTRITIS, UNSPECIFIED, WITHOUT BLEEDING: Chronic | Status: ACTIVE | Noted: 2022-10-04

## 2022-10-21 PROBLEM — I27.20 PULMONARY HYPERTENSION, UNSPECIFIED: Chronic | Status: ACTIVE | Noted: 2022-10-04

## 2022-10-21 PROBLEM — N18.9 CHRONIC KIDNEY DISEASE, UNSPECIFIED: Chronic | Status: ACTIVE | Noted: 2022-10-04

## 2022-10-25 ENCOUNTER — APPOINTMENT (OUTPATIENT)
Dept: ORTHOPEDIC SURGERY | Facility: CLINIC | Age: 82
End: 2022-10-25

## 2022-10-28 ENCOUNTER — APPOINTMENT (OUTPATIENT)
Dept: ORTHOPEDIC SURGERY | Facility: CLINIC | Age: 82
End: 2022-10-28

## 2022-10-28 PROCEDURE — 99024 POSTOP FOLLOW-UP VISIT: CPT

## 2022-10-28 PROCEDURE — 73562 X-RAY EXAM OF KNEE 3: CPT | Mod: 50

## 2022-10-28 NOTE — REASON FOR VISIT
[Post-Operative Visit] : a post-operative visit for [Artificial Knee Joint] : an artificial knee joint [FreeTextEntry2] : Lt knee replacement 10/05. Pt states she is in pain.

## 2022-10-28 NOTE — PHYSICAL EXAM
[de-identified] : Left knee bandage removed wound is well-healed range of motion is 0 to 95 degrees.  Appropriate swelling noted. [de-identified] : Knee x-rays were ordered today.  AP standing individual lateral sunrise views were obtained showing matched [employer] type journey bilateral prostheses in good position.

## 2022-10-28 NOTE — DISCUSSION/SUMMARY
[de-identified] : Patient is status post 2 and half weeks left knee replacement with appropriate range of motion and swelling she will continue with her current PT regimen follow-up in 1 month's time.

## 2022-10-28 NOTE — HISTORY OF PRESENT ILLNESS
[de-identified] : Patient returns today she is status post 2 and half weeks left knee replacement she continues to be in a subacute rehab.  Patient is presented today in a wheelchair but she states that she is able to walk fair distances with the help of a physical therapist.

## 2022-12-02 ENCOUNTER — APPOINTMENT (OUTPATIENT)
Dept: ORTHOPEDIC SURGERY | Facility: CLINIC | Age: 82
End: 2022-12-02

## 2022-12-02 DIAGNOSIS — M17.0 BILATERAL PRIMARY OSTEOARTHRITIS OF KNEE: ICD-10-CM

## 2022-12-02 PROCEDURE — 99024 POSTOP FOLLOW-UP VISIT: CPT

## 2022-12-02 NOTE — REASON FOR VISIT
[Post-Operative Visit] : a post-operative visit for [Artificial Knee Joint] : an artificial knee joint [FreeTextEntry2] : Lt knee replaced 10/05. f/u. Pt states there is pain behind the knee and there is swelling.

## 2022-12-02 NOTE — PHYSICAL EXAM
[de-identified] : Appropriate level of swelling and warmth noted range of motion 0 to 125 degrees.  The knee is stable to AP stress varus valgus stress in both full extension and 90 degrees of flexion.

## 2022-12-02 NOTE — HISTORY OF PRESENT ILLNESS
[de-identified] : Patient returns today 3 days shy of 2 months status post left knee replacement she is here to have it evaluated patient is stating she notices it seems warm sometimes stiff and sometimes sore she is resorted to taking Tylenol in the last few days.  Otherwise she is doing fairly well.

## 2022-12-02 NOTE — DISCUSSION/SUMMARY
[de-identified] : Patient has an appropriate amount of swelling and warmth about the need to help reduce some of her discomfort we advised to ice the knee twice a day for a week she will also be given a prescription for Celebrex 200 mg p.o. twice daily for 5 days.  Reasonable risk and benefits of the Celebrex were discussed in detail patient understands potential side effects.  We reviewed her current medications are seems to be no contraindication to its limited intermittent use.  Patient will follow-up in 2 weeks if not thoroughly improved.

## 2022-12-16 ENCOUNTER — APPOINTMENT (OUTPATIENT)
Dept: ORTHOPEDIC SURGERY | Facility: CLINIC | Age: 82
End: 2022-12-16

## 2022-12-27 ENCOUNTER — LABORATORY RESULT (OUTPATIENT)
Age: 82
End: 2022-12-27

## 2023-06-21 NOTE — PHYSICAL THERAPY INITIAL EVALUATION ADULT - ADDITIONAL COMMENTS
Last seen 3/27/23 refilled as directed recent R TKA, lives in first floor apartment, steps to enter, however has ramp, has had 3 falls in past year, no hha

## 2024-01-26 ENCOUNTER — APPOINTMENT (OUTPATIENT)
Dept: ORTHOPEDIC SURGERY | Facility: CLINIC | Age: 84
End: 2024-01-26
Payer: MEDICARE

## 2024-01-26 VITALS — WEIGHT: 144 LBS | HEIGHT: 65 IN | BODY MASS INDEX: 23.99 KG/M2

## 2024-01-26 PROCEDURE — 99214 OFFICE O/P EST MOD 30 MIN: CPT

## 2024-01-26 RX ORDER — METHYLPREDNISOLONE 4 MG/1
4 TABLET ORAL
Qty: 1 | Refills: 1 | Status: ACTIVE | COMMUNITY
Start: 2024-01-26 | End: 1900-01-01

## 2024-01-26 NOTE — DISCUSSION/SUMMARY
[de-identified] : Patient I had a long conversation about how her knee exam and bilateral hip exams are fairly benign today.  There is no evidence suggest any issues with the knees in terms of possible loosening migration wear or infection or instability.  Patient I discussed at length how her current symptoms are most consistent with a diagnosis of lumbar spinal stenosis.  At this point we talked about her options patient was placed on a Medrol Dosepak the reason risk benefits of medication were discussed in detail including potential side effects.  We reviewed her current medication profile and even with her current blood thinner the Medrol Dosepak is not contraindicated.  Patient will follow-up in 10 days to 2 weeks if not thoroughly improved for further evaluation treatment options.  Today's consultation lasted 30 minutes.

## 2024-01-26 NOTE — HISTORY OF PRESENT ILLNESS
[de-identified] : Well-known patient returns today she is not really complaining of knee pain but rather low back pain with radiating pain to both knees.  She feels a burning sensation occasionally she started noticed decreased ambulatory range due to back discomfort.  Patient recently had bilateral blood clots diagnosed and she is currently placed on Eliquis.  She is status post bilateral knee replacements the right was replaced in 7/21 left in 10/2022.

## 2024-01-26 NOTE — PHYSICAL EXAM
[de-identified] : Patient is full passive internal/external rotation of both hips with no mechanical restriction block or pain.  Right knee has full range of motion minimal warmth no crepitus the knee is stable to stress varus valgus stress in both full extension and 90 degrees of flexion.  Similar findings on the left knee range of motion 0 to 105 degrees minimal warmth no soft tissue swelling knee stable extra stress on stress both full extension and 90 degrees of flexion.  Patient is exhibiting some mild paraspinal tenderness palpation of the lumbar segments of the lumbar spine.  Negative straight leg raising test neurovascular intact distally.

## 2024-01-26 NOTE — REASON FOR VISIT
[Follow-Up Visit] : a follow-up visit for [Artificial Knee Joint] : an artificial knee joint [Knee Pain] : knee pain [FreeTextEntry2] : BILATERAL KNEE PAIN. BOTH KNEES WERE REPLACED. THE RIGHT KNEE REPLACED 7/21/21 WHICH IS WORSE AND TH E LEFT REPLACED 10/5/22.

## 2024-02-06 NOTE — ED PROVIDER NOTE - PMH
Arthritis  both knees  GERD (gastroesophageal reflux disease)    HTN (hypertension)     [Negative] : Heme/Lymph

## 2024-04-08 ENCOUNTER — APPOINTMENT (OUTPATIENT)
Dept: ORTHOPEDIC SURGERY | Facility: CLINIC | Age: 84
End: 2024-04-08
Payer: MEDICARE

## 2024-04-08 PROCEDURE — 73562 X-RAY EXAM OF KNEE 3: CPT | Mod: 50

## 2024-04-08 PROCEDURE — 99213 OFFICE O/P EST LOW 20 MIN: CPT

## 2024-04-15 NOTE — REASON FOR VISIT
[Follow-Up Visit] : a follow-up visit for [Knee Pain] : knee pain [Other: ____] : [unfilled] [FreeTextEntry2] : she is having a lot of back pain preventing her from walking with her walker. she is also having some discomfort in the knees. both replaced the right in 07/2021 and the left 10/2022.

## 2024-04-19 ENCOUNTER — APPOINTMENT (OUTPATIENT)
Dept: PHYSICAL MEDICINE AND REHAB | Facility: CLINIC | Age: 84
End: 2024-04-19
Payer: MEDICARE

## 2024-04-19 DIAGNOSIS — M48.062 SPINAL STENOSIS, LUMBAR REGION WITH NEUROGENIC CLAUDICATION: ICD-10-CM

## 2024-04-19 PROCEDURE — 72100 X-RAY EXAM L-S SPINE 2/3 VWS: CPT

## 2024-04-19 PROCEDURE — 99203 OFFICE O/P NEW LOW 30 MIN: CPT

## 2024-04-19 RX ORDER — CELECOXIB 200 MG/1
200 CAPSULE ORAL
Qty: 30 | Refills: 3 | Status: DISCONTINUED | COMMUNITY
Start: 2022-05-19 | End: 2024-04-19

## 2024-04-19 RX ORDER — CELECOXIB 200 MG/1
200 CAPSULE ORAL
Qty: 30 | Refills: 0 | Status: DISCONTINUED | COMMUNITY
Start: 2022-12-02 | End: 2024-04-19

## 2024-04-19 RX ORDER — CELECOXIB 200 MG/1
200 CAPSULE ORAL
Qty: 30 | Refills: 0 | Status: DISCONTINUED | COMMUNITY
Start: 2022-09-01 | End: 2024-04-19

## 2024-04-19 RX ORDER — CELECOXIB 200 MG/1
200 CAPSULE ORAL
Qty: 30 | Refills: 0 | Status: DISCONTINUED | COMMUNITY
Start: 2022-01-21 | End: 2024-04-19

## 2024-04-19 RX ORDER — APIXABAN 5 MG/1
TABLET, FILM COATED ORAL
Refills: 0 | Status: ACTIVE | COMMUNITY

## 2024-04-19 RX ORDER — CELECOXIB 200 MG/1
200 CAPSULE ORAL DAILY
Qty: 30 | Refills: 1 | Status: DISCONTINUED | COMMUNITY
Start: 2022-01-21 | End: 2024-04-19

## 2024-04-19 RX ORDER — AMOXICILLIN 500 MG/1
500 CAPSULE ORAL
Qty: 12 | Refills: 0 | Status: DISCONTINUED | COMMUNITY
Start: 2022-03-21 | End: 2024-04-19

## 2024-05-06 ENCOUNTER — NON-APPOINTMENT (OUTPATIENT)
Age: 84
End: 2024-05-06

## 2024-07-11 NOTE — H&P ADULT - PROBLEM/PLAN-1
Pt to bathroom via wheel chair without difficulty. Remains in stable condition with call light in reach.    DISPLAY PLAN FREE TEXT

## 2024-07-24 ENCOUNTER — APPOINTMENT (OUTPATIENT)
Dept: PHYSICAL MEDICINE AND REHAB | Facility: CLINIC | Age: 84
End: 2024-07-24
Payer: MEDICARE

## 2024-07-24 DIAGNOSIS — M47.816 SPONDYLOSIS W/OUT MYELOPATHY OR RADICULOPATHY, LUMBAR REGION: ICD-10-CM

## 2024-07-24 DIAGNOSIS — M48.062 SPINAL STENOSIS, LUMBAR REGION WITH NEUROGENIC CLAUDICATION: ICD-10-CM

## 2024-07-24 PROCEDURE — 99214 OFFICE O/P EST MOD 30 MIN: CPT

## 2024-07-24 NOTE — ASSESSMENT
[FreeTextEntry1] : MRI shows moderate L4/5 stenosis and L5/S1 disc bulge pinching S1 nerves.  s/p bilat TKA Discussed diagnosis and treatment plan including PT. Reminded to not take nsaids since on eliquis.  Talk to doctor to see how long she needs it ice area often Finished PT.  Gave exercise sheet to remind her.  Discussed facet injections.  Left L4/5 facet at 20 deg, L5/S1 at 5 deg.    Taken home by friend see Derm for skin roughening in forearms  f/u after mri

## 2024-07-24 NOTE — PHYSICAL EXAM
[FreeTextEntry1] : ÓSCAR is a 83 year old female  Constitutional: healthy appearing, NAD, and normal body habitus  LUMBAR ROM: flexion to 30 deg, ext to 5 deg  Gait: antalgic and off balance, here with rollator  Inspection: no erythema, warmth Spine: no TTP in spinous process Bony palpation: no TTP in GT  Soft tissue palpation hip: no TTP in gluteus jake Soft tissue palpation of spine: no TTP in lumbar paraspinals  5/5 bilateral KE, DF, PF  sensation intact in bilat LE except decreased in toes   Special tests: neg seated SLR

## 2024-07-24 NOTE — HISTORY OF PRESENT ILLNESS
[FreeTextEntry1] : Location: back Severity: severe Duration: 2 yr Context:  Aggravating Factors: getting up Alleviating Factors: rest Associated Symptoms: denies weight loss, fever, chills, change in bowel/bladder habits, +weakness, numbness/tingling in toes, radiation down legs Prior Studies:

## 2024-07-26 ENCOUNTER — NON-APPOINTMENT (OUTPATIENT)
Age: 84
End: 2024-07-26

## 2024-08-26 ENCOUNTER — APPOINTMENT (OUTPATIENT)
Dept: PHYSICAL MEDICINE AND REHAB | Facility: CLINIC | Age: 84
End: 2024-08-26
Payer: MEDICARE

## 2024-08-26 DIAGNOSIS — M47.816 SPONDYLOSIS W/OUT MYELOPATHY OR RADICULOPATHY, LUMBAR REGION: ICD-10-CM

## 2024-08-26 PROCEDURE — 64493 INJ PARAVERT F JNT L/S 1 LEV: CPT | Mod: LT

## 2024-08-26 PROCEDURE — 64494 INJ PARAVERT F JNT L/S 2 LEV: CPT | Mod: LT

## 2024-08-26 NOTE — ASSESSMENT
[FreeTextEntry1] : She stopped eliquis 1 day.  Restart as soon as she gets home.  She understands risk of immunosuppression from steroids.  Do not get covid vaccine for 2 wk.  check BP next few days.  f/u with PCP if too high or low since has been dizzy.  f/u 2 wk

## 2024-08-26 NOTE — PROCEDURE
[de-identified] : indication: pain   Fluoroscopically Guided, Contrast Enhanced, Left  L4/5 and L5/S1 Facet Injections   After informed consent, she was placed prone on the fluoroscopy table. Skin over the area was prepped and draped in the usual sterile fashion before being anesthetized with 1% Lidocaine. Then using an oblique approach, a 3.5  in spinal needle was directed down to the L5/S1 facet joint. Needle placement was confirmed with AP fluoroscopic images. After negative aspiration for blood or cerebrospinal fluid, contrast was instilled under live fluoroscopy and an arthrogram was obtained. It showed good flow in the capsule without any vascular uptake. Then a steroid solution consisting of 20 mg of Kenalog and 1 ml of 1% Lidocaine was instilled. The same procedure was repeated at the    L4/5 facet joint.   She tolerated the procedure well and was discharged in good condition without any complications or complaints.  She  was given discharge instructions and asked to follow-up in clinic in two weeks.     Manufacture [employer] Omnipaque 180 NDC 661064437 Exp 12/15/25 [id number]   Lidocaine Manufacture [employer] NDC 22834-587-83 Exp 9/25 LOT [id number]    Triamcinolone NDC [address]-3 Exp 11/25 Lot [id number] Manufacture [employer]

## 2024-09-05 NOTE — PATIENT PROFILE ADULT - NSPROPOAURINARYCATHETER_GEN_A_NUR
Post-Care Instructions: I reviewed with the patient in detail post-care instructions. Patient is to wear sunprotection, and avoid picking at any of the treated lesions. Pt may apply Vaseline to crusted or scabbing areas. Duration Of Freeze Thaw-Cycle (Seconds): 10 Application Tool (Optional): Cry-AC Render Note In Bullet Format When Appropriate: No Detail Level: Detailed Aperture Size (Optional): A Show Applicator Variable?: Yes Consent: The patient's consent was obtained including but not limited to risks of crusting, scabbing, blistering, scarring, darker or lighter pigmentary change, recurrence, incomplete removal and infection. Number Of Freeze-Thaw Cycles: 1 freeze-thaw cycle no

## 2024-09-06 NOTE — ED PROVIDER NOTE - ATTESTATION, MLM
I have reviewed and confirmed nurses' notes for patient's medications, allergies, medical history, and surgical history.
no fever/no chills/no sweating

## 2024-09-10 ENCOUNTER — APPOINTMENT (OUTPATIENT)
Dept: PHYSICAL MEDICINE AND REHAB | Facility: CLINIC | Age: 84
End: 2024-09-10
Payer: MEDICARE

## 2024-09-10 DIAGNOSIS — M47.816 SPONDYLOSIS W/OUT MYELOPATHY OR RADICULOPATHY, LUMBAR REGION: ICD-10-CM

## 2024-09-10 DIAGNOSIS — K59.03 DRUG INDUCED CONSTIPATION: ICD-10-CM

## 2024-09-10 DIAGNOSIS — M48.062 SPINAL STENOSIS, LUMBAR REGION WITH NEUROGENIC CLAUDICATION: ICD-10-CM

## 2024-09-10 PROCEDURE — 99214 OFFICE O/P EST MOD 30 MIN: CPT

## 2024-09-10 RX ORDER — METHYLNALTREXONE BROMIDE 150 MG/1
150 TABLET ORAL
Qty: 60 | Refills: 0 | Status: ACTIVE | COMMUNITY
Start: 2024-09-10 | End: 1900-01-01

## 2024-09-10 RX ORDER — LUBIPROSTONE 24 UG/1
24 CAPSULE ORAL
Qty: 60 | Refills: 0 | Status: ACTIVE | COMMUNITY
Start: 2024-09-10 | End: 1900-01-01

## 2024-09-10 RX ORDER — ACETAMINOPHEN AND CODEINE PHOSPHATE 300; 30 MG/1; MG/1
300-30 TABLET ORAL
Qty: 14 | Refills: 0 | Status: ACTIVE | COMMUNITY
Start: 2024-09-10 | End: 1900-01-01

## 2024-09-10 NOTE — HISTORY OF PRESENT ILLNESS
[FreeTextEntry1] : Location: back Severity: severe Duration: 2 yr Context:  Aggravating Factors: getting up Alleviating Factors: rest, using walker Associated Symptoms: denies weight loss, fever, chills, change in bowel/bladder habits, +weakness, numbness/tingling in toes, radiation down legs, chronic constipation Prior Studies: MRI 2024 8/2024 left L4/5 and L5/S1 facet injections

## 2024-09-10 NOTE — ASSESSMENT
[FreeTextEntry1] : MRI shows moderate L4/5 stenosis and L5/S1 disc bulge pinching S1 nerves.  s/p bilat TKA Discussed diagnosis and treatment plan including PT. Reminded to not take nsaids since on eliquis.  Talk to doctor to see how long she needs it ice area often Finished PT.  So do HEP She has tried conservative tx including PT, nsaids for past 3 months without relief. Discussed LEXY in detail.  Risks include bleeding.  Stop nsaids 2 days before.   She will continue a comprehensive rehabilitation program afterward, as this is important to prevent recurrence of pain. warned of constipation with tyl #3  constipation - see PCP or GI if not better.    Taken home by friend   f/u

## 2024-09-10 NOTE — PHYSICAL EXAM
[FreeTextEntry1] : ÓSCAR is a 84 year old female  Constitutional: healthy appearing, NAD, and normal body habitus  LUMBAR ROM: flexion to 30 deg, ext to 5 deg  Gait: antalgic and off balance, here with rollator  Inspection: no erythema, warmth Spine: no TTP in spinous process Bony palpation: no TTP in GT  Soft tissue palpation hip: no TTP in gluteus jake Soft tissue palpation of spine: no TTP in lumbar paraspinals  5/5 bilateral KE, DF, PF  sensation intact in bilat LE except decreased in toes   Special tests: neg seated SLR

## 2024-09-27 ENCOUNTER — APPOINTMENT (OUTPATIENT)
Dept: PHYSICAL MEDICINE AND REHAB | Facility: CLINIC | Age: 84
End: 2024-09-27

## 2024-09-30 ENCOUNTER — APPOINTMENT (OUTPATIENT)
Dept: PHYSICAL MEDICINE AND REHAB | Facility: CLINIC | Age: 84
End: 2024-09-30
Payer: MEDICARE

## 2024-09-30 DIAGNOSIS — M48.062 SPINAL STENOSIS, LUMBAR REGION WITH NEUROGENIC CLAUDICATION: ICD-10-CM

## 2024-09-30 PROCEDURE — 64483 NJX AA&/STRD TFRM EPI L/S 1: CPT | Mod: 50

## 2024-09-30 NOTE — PROCEDURE
[de-identified] : indication: pain  Fluoroscopically Guided, Contrast Enhanced, Bilateral S1 Epidural Steroid Injection  After informed consent, she the patient was placed prone on the fluoroscopy table. Skin over the area was prepped and draped in the usual sterile fashion before being anesthetized with 1% Lidocaine. Then using a 22 gauge 3.5 in spinal needle was directed down to the S1 foramen.   Needle placement was confirmed with lateral fluoroscopic images. After negative aspiration for blood or cerebrospinal fluid, contrast was instilled under live fluoroscopy and an epidurogram was obtained. It showed good epidural flow without any vascular uptake. Then a steroid solution consisting of 20 mg of Kenalog, 1.5 ml of 0.5% Lidocaine was instilled. The same procedure was repeated on the right side.    She  was discharged in good condition.  Instructions given:  No soaking or bathing for 2 days but can take a shower.  No strenuous exercise for 4 days.        Manufacture [employer] Omnipaque 180 NDC [id number] Exp 12/15/25 [id number]         Lidocaine Manufacture [employer] NDC 88371-665-20 Exp 9/25 LOT [id number]       Triamcinolone NDC [address]-3 Exp 11/25 Lot [id number] Manufacture [employer]

## 2024-09-30 NOTE — ASSESSMENT
[FreeTextEntry1] : She understands risk of immunosuppression from steroids.  Do not get covid vaccine for 2 wk.  Taken home by friend.  f/u 2 wk

## 2024-10-15 ENCOUNTER — APPOINTMENT (OUTPATIENT)
Dept: PHYSICAL MEDICINE AND REHAB | Facility: CLINIC | Age: 84
End: 2024-10-15
Payer: MEDICARE

## 2024-10-15 DIAGNOSIS — M48.062 SPINAL STENOSIS, LUMBAR REGION WITH NEUROGENIC CLAUDICATION: ICD-10-CM

## 2024-10-15 DIAGNOSIS — M47.816 SPONDYLOSIS W/OUT MYELOPATHY OR RADICULOPATHY, LUMBAR REGION: ICD-10-CM

## 2024-10-15 PROCEDURE — 99214 OFFICE O/P EST MOD 30 MIN: CPT

## 2025-02-11 NOTE — ED ADULT NURSE NOTE - GENITOURINARY ASSESSMENT
Previous adm (1/8-16) for sacral OM, rec for PICC line on dc for IV abx however pt refused and discharged on augmentin w/ plan to complete 6 week course (until 2/19).   Gen surg performed bedside superficial wound debridement; wound looks clean.     - continue augmentin until 2/19   - wound care consult - - -

## 2025-02-24 ENCOUNTER — APPOINTMENT (OUTPATIENT)
Dept: ULTRASOUND IMAGING | Facility: HOSPITAL | Age: 85
End: 2025-02-24

## 2025-03-03 ENCOUNTER — APPOINTMENT (OUTPATIENT)
Dept: ORTHOPEDIC SURGERY | Facility: CLINIC | Age: 85
End: 2025-03-03
Payer: MEDICARE

## 2025-03-03 VITALS — BODY MASS INDEX: 22.49 KG/M2 | WEIGHT: 135 LBS | HEIGHT: 65 IN

## 2025-03-03 DIAGNOSIS — M17.0 BILATERAL PRIMARY OSTEOARTHRITIS OF KNEE: ICD-10-CM

## 2025-03-03 PROCEDURE — 73562 X-RAY EXAM OF KNEE 3: CPT | Mod: 50

## 2025-03-03 PROCEDURE — 99214 OFFICE O/P EST MOD 30 MIN: CPT

## (undated) DEVICE — SAW BLADE STRYKER SAGITTAL 25X86.5X1.32MM

## (undated) DEVICE — DRSG COBAN 6"

## (undated) DEVICE — POSITIONER FOAM EGG CRATE ULNAR 2PCS (PINK)

## (undated) DEVICE — STRYKER 4-PORT MANIFOLD W/SPECIMEN COLLECTION

## (undated) DEVICE — PACK TOTAL KNEE

## (undated) DEVICE — WARMING BLANKET UPPER ADULT

## (undated) DEVICE — VENODYNE/SCD SLEEVE CALF MEDIUM

## (undated) DEVICE — SAW BLADE STRYKER SAGITTAL DUAL CUT TEETH 35X64X.64MM

## (undated) DEVICE — SUT STRATAFIX SYMMETRIC PDS 1 45CM OS-6

## (undated) DEVICE — SUT VICRYL 2-0 27" CT-1

## (undated) DEVICE — MARKING PEN W RULER